# Patient Record
Sex: FEMALE | Race: WHITE | Employment: FULL TIME | URBAN - METROPOLITAN AREA
[De-identification: names, ages, dates, MRNs, and addresses within clinical notes are randomized per-mention and may not be internally consistent; named-entity substitution may affect disease eponyms.]

---

## 2018-05-11 ENCOUNTER — OFFICE VISIT (OUTPATIENT)
Dept: PRIMARY CARE CLINIC | Age: 53
End: 2018-05-11
Payer: COMMERCIAL

## 2018-05-11 VITALS
TEMPERATURE: 98.2 F | WEIGHT: 143 LBS | HEART RATE: 73 BPM | HEIGHT: 64 IN | OXYGEN SATURATION: 97 % | DIASTOLIC BLOOD PRESSURE: 64 MMHG | BODY MASS INDEX: 24.41 KG/M2 | SYSTOLIC BLOOD PRESSURE: 110 MMHG

## 2018-05-11 DIAGNOSIS — I34.1 MITRAL VALVE PROLAPSE: ICD-10-CM

## 2018-05-11 DIAGNOSIS — E78.2 MIXED HYPERLIPIDEMIA: Chronic | ICD-10-CM

## 2018-05-11 DIAGNOSIS — K21.9 GASTROESOPHAGEAL REFLUX DISEASE WITHOUT ESOPHAGITIS: ICD-10-CM

## 2018-05-11 DIAGNOSIS — R00.2 HEART PALPITATIONS: Primary | ICD-10-CM

## 2018-05-11 PROCEDURE — 93000 ELECTROCARDIOGRAM COMPLETE: CPT | Performed by: FAMILY MEDICINE

## 2018-05-11 PROCEDURE — 99214 OFFICE O/P EST MOD 30 MIN: CPT | Performed by: FAMILY MEDICINE

## 2018-05-11 ASSESSMENT — ENCOUNTER SYMPTOMS
ABDOMINAL PAIN: 0
WHEEZING: 0
COUGH: 0
DIARRHEA: 0
CHEST TIGHTNESS: 1
SHORTNESS OF BREATH: 0
BLOOD IN STOOL: 0
VOMITING: 0
CONSTIPATION: 0

## 2018-05-11 ASSESSMENT — PATIENT HEALTH QUESTIONNAIRE - PHQ9
2. FEELING DOWN, DEPRESSED OR HOPELESS: 0
1. LITTLE INTEREST OR PLEASURE IN DOING THINGS: 0
SUM OF ALL RESPONSES TO PHQ9 QUESTIONS 1 & 2: 0
SUM OF ALL RESPONSES TO PHQ QUESTIONS 1-9: 0

## 2018-05-14 ENCOUNTER — HOSPITAL ENCOUNTER (OUTPATIENT)
Age: 53
Discharge: HOME | End: 2018-05-16
Payer: COMMERCIAL

## 2018-05-14 ENCOUNTER — NURSE ONLY (OUTPATIENT)
Dept: PRIMARY CARE CLINIC | Age: 53
End: 2018-05-14

## 2018-05-14 DIAGNOSIS — E78.2 MIXED HYPERLIPIDEMIA: Chronic | ICD-10-CM

## 2018-05-14 DIAGNOSIS — R00.2 HEART PALPITATIONS: ICD-10-CM

## 2018-05-14 LAB
ALBUMIN SERPL-MCNC: 4.4 G/DL (ref 3.5–5.2)
ALP BLD-CCNC: 50 U/L (ref 35–104)
ALT SERPL-CCNC: 12 U/L (ref 0–32)
ANION GAP SERPL CALCULATED.3IONS-SCNC: 15 MMOL/L (ref 7–16)
AST SERPL-CCNC: 16 U/L (ref 0–31)
BILIRUB SERPL-MCNC: 0.5 MG/DL (ref 0–1.2)
BUN BLDV-MCNC: 15 MG/DL (ref 6–20)
CALCIUM SERPL-MCNC: 9.8 MG/DL (ref 8.6–10.2)
CHLORIDE BLD-SCNC: 101 MMOL/L (ref 98–107)
CHOLESTEROL, TOTAL: 195 MG/DL (ref 0–199)
CO2: 24 MMOL/L (ref 22–29)
CREAT SERPL-MCNC: 0.7 MG/DL (ref 0.5–1)
GFR AFRICAN AMERICAN: >60
GFR NON-AFRICAN AMERICAN: >60 ML/MIN/1.73
GLUCOSE BLD-MCNC: 94 MG/DL (ref 74–109)
HCT VFR BLD CALC: 42.2 % (ref 34–48)
HDLC SERPL-MCNC: 59 MG/DL
HEMOGLOBIN: 14.2 G/DL (ref 11.5–15.5)
LDL CHOLESTEROL CALCULATED: 121 MG/DL (ref 0–99)
MCH RBC QN AUTO: 31.7 PG (ref 26–35)
MCHC RBC AUTO-ENTMCNC: 33.6 % (ref 32–34.5)
MCV RBC AUTO: 94.2 FL (ref 80–99.9)
PDW BLD-RTO: 13.2 FL (ref 11.5–15)
PLATELET # BLD: 297 E9/L (ref 130–450)
PMV BLD AUTO: 10.6 FL (ref 7–12)
POTASSIUM SERPL-SCNC: 4.6 MMOL/L (ref 3.5–5)
RBC # BLD: 4.48 E12/L (ref 3.5–5.5)
SODIUM BLD-SCNC: 140 MMOL/L (ref 132–146)
TOTAL PROTEIN: 6.9 G/DL (ref 6.4–8.3)
TRIGL SERPL-MCNC: 75 MG/DL (ref 0–149)
VLDLC SERPL CALC-MCNC: 15 MG/DL
WBC # BLD: 8.2 E9/L (ref 4.5–11.5)

## 2018-05-14 PROCEDURE — 80053 COMPREHEN METABOLIC PANEL: CPT

## 2018-05-14 PROCEDURE — 85027 COMPLETE CBC AUTOMATED: CPT

## 2018-05-14 PROCEDURE — 80061 LIPID PANEL: CPT

## 2019-10-14 ENCOUNTER — OFFICE VISIT (OUTPATIENT)
Dept: PRIMARY CARE CLINIC | Age: 54
End: 2019-10-14
Payer: COMMERCIAL

## 2019-10-14 VITALS
DIASTOLIC BLOOD PRESSURE: 82 MMHG | OXYGEN SATURATION: 98 % | BODY MASS INDEX: 23.17 KG/M2 | TEMPERATURE: 96.7 F | SYSTOLIC BLOOD PRESSURE: 124 MMHG | WEIGHT: 135 LBS | HEART RATE: 57 BPM

## 2019-10-14 DIAGNOSIS — M54.2 CERVICAL PAIN (NECK): Primary | ICD-10-CM

## 2019-10-14 DIAGNOSIS — R09.89 GLOBUS SENSATION: ICD-10-CM

## 2019-10-14 PROCEDURE — 99214 OFFICE O/P EST MOD 30 MIN: CPT | Performed by: FAMILY MEDICINE

## 2019-10-14 RX ORDER — TIZANIDINE HYDROCHLORIDE 4 MG/1
4 CAPSULE, GELATIN COATED ORAL 3 TIMES DAILY
Qty: 30 CAPSULE | Refills: 0 | Status: SHIPPED
Start: 2019-10-14 | End: 2021-04-03

## 2019-10-14 ASSESSMENT — PATIENT HEALTH QUESTIONNAIRE - PHQ9
2. FEELING DOWN, DEPRESSED OR HOPELESS: 0
1. LITTLE INTEREST OR PLEASURE IN DOING THINGS: 0
SUM OF ALL RESPONSES TO PHQ QUESTIONS 1-9: 0
SUM OF ALL RESPONSES TO PHQ QUESTIONS 1-9: 0
SUM OF ALL RESPONSES TO PHQ9 QUESTIONS 1 & 2: 0

## 2019-10-14 ASSESSMENT — ENCOUNTER SYMPTOMS
SORE THROAT: 0
TROUBLE SWALLOWING: 0
BACK PAIN: 1
VOICE CHANGE: 0

## 2019-10-16 ENCOUNTER — HOSPITAL ENCOUNTER (OUTPATIENT)
Dept: GENERAL RADIOLOGY | Age: 54
Discharge: HOME | End: 2019-10-18
Payer: COMMERCIAL

## 2019-10-16 ENCOUNTER — HOSPITAL ENCOUNTER (OUTPATIENT)
Age: 54
Discharge: HOME | End: 2019-10-18
Payer: COMMERCIAL

## 2019-10-16 DIAGNOSIS — M54.2 CERVICAL PAIN (NECK): ICD-10-CM

## 2019-10-16 PROCEDURE — 72040 X-RAY EXAM NECK SPINE 2-3 VW: CPT

## 2019-10-18 ENCOUNTER — TELEPHONE (OUTPATIENT)
Dept: PRIMARY CARE CLINIC | Age: 54
End: 2019-10-18

## 2020-10-06 ENCOUNTER — HOSPITAL ENCOUNTER (OUTPATIENT)
Age: 55
Discharge: HOME | End: 2020-10-08
Payer: COMMERCIAL

## 2020-10-06 PROCEDURE — U0003 INFECTIOUS AGENT DETECTION BY NUCLEIC ACID (DNA OR RNA); SEVERE ACUTE RESPIRATORY SYNDROME CORONAVIRUS 2 (SARS-COV-2) (CORONAVIRUS DISEASE [COVID-19]), AMPLIFIED PROBE TECHNIQUE, MAKING USE OF HIGH THROUGHPUT TECHNOLOGIES AS DESCRIBED BY CMS-2020-01-R: HCPCS

## 2020-10-08 LAB
SARS-COV-2: NOT DETECTED
SOURCE: NORMAL

## 2020-11-25 LAB
SARS-COV-2: NOT DETECTED
SOURCE: NORMAL

## 2020-12-02 LAB
SARS-COV-2: NOT DETECTED
SOURCE: NORMAL

## 2021-02-11 LAB
SARS-COV-2: NOT DETECTED
SOURCE: NORMAL

## 2021-03-06 LAB
SARS-COV-2: NOT DETECTED
SOURCE: NORMAL

## 2021-03-13 LAB
SARS-COV-2: NOT DETECTED
SOURCE: NORMAL

## 2021-03-20 LAB
SARS-COV-2: NOT DETECTED
SOURCE: NORMAL

## 2021-03-27 LAB
SARS-COV-2: NOT DETECTED
SOURCE: NORMAL

## 2021-04-03 ENCOUNTER — APPOINTMENT (OUTPATIENT)
Dept: GENERAL RADIOLOGY | Age: 56
End: 2021-04-03
Payer: COMMERCIAL

## 2021-04-03 ENCOUNTER — HOSPITAL ENCOUNTER (EMERGENCY)
Age: 56
Discharge: HOME | End: 2021-04-03
Attending: EMERGENCY MEDICINE
Payer: COMMERCIAL

## 2021-04-03 VITALS
WEIGHT: 139 LBS | TEMPERATURE: 98.4 F | DIASTOLIC BLOOD PRESSURE: 82 MMHG | SYSTOLIC BLOOD PRESSURE: 130 MMHG | HEART RATE: 70 BPM | OXYGEN SATURATION: 100 % | RESPIRATION RATE: 16 BRPM | HEIGHT: 63 IN | BODY MASS INDEX: 24.63 KG/M2

## 2021-04-03 DIAGNOSIS — S82.832A CLOSED FRACTURE OF DISTAL END OF LEFT FIBULA, UNSPECIFIED FRACTURE MORPHOLOGY, INITIAL ENCOUNTER: Primary | ICD-10-CM

## 2021-04-03 PROCEDURE — 99282 EMERGENCY DEPT VISIT SF MDM: CPT

## 2021-04-03 PROCEDURE — 73610 X-RAY EXAM OF ANKLE: CPT

## 2021-04-03 PROCEDURE — 29515 APPLICATION SHORT LEG SPLINT: CPT

## 2021-04-03 RX ORDER — HYDROCODONE BITARTRATE AND ACETAMINOPHEN 5; 325 MG/1; MG/1
1 TABLET ORAL EVERY 6 HOURS PRN
Qty: 12 TABLET | Refills: 0 | Status: SHIPPED | OUTPATIENT
Start: 2021-04-03 | End: 2021-04-06

## 2021-04-03 ASSESSMENT — PAIN DESCRIPTION - LOCATION: LOCATION: ANKLE

## 2021-04-03 ASSESSMENT — PAIN SCALES - GENERAL: PAINLEVEL_OUTOF10: 2

## 2021-04-04 NOTE — ED PROVIDER NOTES
HPI:  4/3/21,   Time: 9:00 PM EDT       Jorge Gonsalves is a 63 y.o. female presenting to the ED for fall/jose de jesus ankle pain, beginning 30 min ago. The complaint has been persistent, mild in severity, and worsened by bearing weight. Left worse than right, fall mech, stepped off stairs wrong, no other pain. No loc, no abd/chest trauma    Review of Systems:   Pertinent positives and negatives are stated within HPI, all other systems reviewed and are negative.          --------------------------------------------- PAST HISTORY ---------------------------------------------  Past Medical History:  has a past medical history of Anemia, iron deficiency, GERD (gastroesophageal reflux disease), Heart palpitations, Intermittent lightheadedness, Mitral valve prolapse, Osteoarthritis, and Sinus bradycardia. Past Surgical History:  has a past surgical history that includes Tubal ligation (2008); Colonoscopy (03/14/2015); and Hysterectomy, vaginal (2016). Social History:  reports that she has been smoking cigarettes. She has a 25.00 pack-year smoking history. She has never used smokeless tobacco. She reports current alcohol use. Family History: family history includes Arthritis in her mother; Cancer in her father; No Known Problems in her child, child, child and another family member; Other in her brother, father, and mother. The patients home medications have been reviewed. Allergies: Latex and Metamucil [psyllium]        ---------------------------------------------------PHYSICAL EXAM--------------------------------------    Constitutional/General: Alert and oriented x3, well appearing, non toxic in NAD  Head: Normocephalic and atraumatic  Eyes: PERRL, EOMI, conjunctive normal, sclera non icteric  Mouth: Oropharynx clear, handling secretions,  Neck: Supple, full ROM,   Musculoskeletal: Moves all extremities x 4. Warm and well perfused, no clubbing, cyanosis, or edema.  Capillary refill <3 seconds, right ankle go gómez after, dc on crutches, outpt fu      This patient's ED course included: a personal history and physicial examination    This patient has remained hemodynamically stable during their ED course. Counseling: The emergency provider has spoken with the patient and discussed todays results, in addition to providing specific details for the plan of care and counseling regarding the diagnosis and prognosis. Questions are answered at this time and they are agreeable with the plan.       --------------------------------- IMPRESSION AND DISPOSITION ---------------------------------    IMPRESSION  1. Closed fracture of distal end of left fibula, unspecified fracture morphology, initial encounter        DISPOSITION  Disposition: Discharge to home  Patient condition is stable    NOTE: This report was transcribed using voice recognition software.  Every effort was made to ensure accuracy; however, inadvertent computerized transcription errors may be present        Sandra Saucedo MD  04/03/21 [id number]

## 2021-11-12 ENCOUNTER — OFFICE VISIT (OUTPATIENT)
Dept: PRIMARY CARE CLINIC | Age: 56
End: 2021-11-12
Payer: COMMERCIAL

## 2021-11-12 VITALS
SYSTOLIC BLOOD PRESSURE: 128 MMHG | HEART RATE: 80 BPM | DIASTOLIC BLOOD PRESSURE: 80 MMHG | BODY MASS INDEX: 24.98 KG/M2 | TEMPERATURE: 98 F | OXYGEN SATURATION: 96 % | WEIGHT: 141 LBS

## 2021-11-12 DIAGNOSIS — U07.1 COVID-19 VIRUS INFECTION: ICD-10-CM

## 2021-11-12 DIAGNOSIS — Z00.00 ANNUAL PHYSICAL EXAM: Primary | ICD-10-CM

## 2021-11-12 PROCEDURE — 99396 PREV VISIT EST AGE 40-64: CPT | Performed by: FAMILY MEDICINE

## 2021-11-12 PROCEDURE — 81002 URINALYSIS NONAUTO W/O SCOPE: CPT | Performed by: FAMILY MEDICINE

## 2021-11-12 ASSESSMENT — PATIENT HEALTH QUESTIONNAIRE - PHQ9
1. LITTLE INTEREST OR PLEASURE IN DOING THINGS: 0
SUM OF ALL RESPONSES TO PHQ9 QUESTIONS 1 & 2: 0
SUM OF ALL RESPONSES TO PHQ QUESTIONS 1-9: 0
2. FEELING DOWN, DEPRESSED OR HOPELESS: 0
SUM OF ALL RESPONSES TO PHQ QUESTIONS 1-9: 0
SUM OF ALL RESPONSES TO PHQ QUESTIONS 1-9: 0

## 2021-11-12 NOTE — PROGRESS NOTES
SUBJECTIVE:    HPI: Travon Giraldo  Was seen here today for   Chief Complaint   Patient presents with    Annual Exam    .  She states she is dealing with no new issues. Past Medical History:   Diagnosis Date    Anemia, iron deficiency 02/13/2015    chronic    COVID-19 virus infection 09/04/2021    GERD (gastroesophageal reflux disease)     gerd    Heart palpitations 07/10/2007    and feelings og tachycardia    Intermittent lightheadedness 07/10/2007    mild with dizziness and no syncope    Mitral valve prolapse 1980    Osteoarthritis 03/28/2013    Carpometacarpal arthritis Rt thumb> Ly for years    Sinus bradycardia        Past Surgical History:   Procedure Laterality Date    ANKLE SURGERY Left 04/13/2021    orif Dr. Aiden Vick COLONOSCOPY  03/14/2015    HYSTERECTOMY, VAGINAL  2016    Dr. Wojciech Saldaña  2008    and ablation       Family History   Problem Relation Age of Onset    Arthritis Mother     Other Mother         MI at age 48   Hazel Cancer Father         lung    Other Father         MI/CABG    No Known Problems Other     No Known Problems Child     No Known Problems Child     No Known Problems Child     Other Brother         marfan syndrome       Prior to Admission medications    Medication Sig Start Date End Date Taking?  Authorizing Provider   omeprazole (PRILOSEC) 20 MG capsule Take 20 mg by mouth daily    Historical Provider, MD       Latex and Metamucil [psyllium]    Social History     Tobacco Use    Smoking status: Current Every Day Smoker     Packs/day: 1.00     Years: 25.00     Pack years: 25.00     Types: Cigarettes    Smokeless tobacco: Never Used   Substance Use Topics    Alcohol use: Yes     Comment: social         Review Of Systems:    Skin: no abnormal pigmentation, rash, scaling, itching, masses, hair or nail changes  Eyes: no blurring, diplopia, or eye pain  Ears/Nose/Throat: no hearing loss, tinnitus, vertigo, nosebleed, nasal congestion, rhinorrhea, sore throat  Respiratory: no cough, pleuritic chest pain, dyspnea, or wheezing. Still smoking. Cardiovascular: no chest pain, angina, dyspnea on exertion, orthopnea, PND, palpitations, or claudication  Gastrointestinal: no nausea, vomiting, heartburn, diarrhea, constipation, bloating,  abdominal pain, or blood per rectum  Genitourinary: no urinary urgency, frequency, dysuria, nocturia, hesitancy, or incontinence  Musculoskeletal: no arthritis, arthralgia, myalgia, weakness, or morning stiffness  Neurologic: no paralysis, paresis, paresthesia, seizures, tremors, or headaches  Hematologic/Lymphatic/Immunologic: no anemia, abnormal bleeding/bruising, fever, chills, night sweats, swollen glands, or unexplained weight loss  Endocrine: no heat or cold intolerance and no polyphagia, polydipsia, or polyuria      OBJECTIVE:    VS: /80   Pulse 80   Temp 98 °F (36.7 °C)   Wt 141 lb (64 kg)   SpO2 96%   BMI 24.98 kg/m²   General appearance: Alert, Awake, Oriented times 3, no distress  Skin: Warm and dry  Head: Normocephalic. No masses, lesions or tenderness noted  Eyes: Conjunctivae appear normal. PERRL  Ears: External ears normal, TM's clear and intact  Nose/Sinuses: Nares normal. Septum midline. Mucosa normal. No drainage  Oropharynx: Oropharynx clear with no exudate seen  Neck: Neck supple. No jugular venous distension, lymphadenopathy or thyromegaly Trachea midline. No carotid bruits  Lungs: Lungs clear to auscultation bilaterally, but decreased. No rhonchi, crackles or wheezes  Heart: S1 S2  Regular rate and rhythm. No rub, murmur or gallop  Abdomen: Abdomen soft, non-tender. BS normal. No masses, organomegaly  Extremities: No edema, Peripheral pulses palpable  Musculoskeletal: Muscular strength appears intact. No joint effusion, tenderness, swelling or warmth  Neuro:  No focal motor or sensory deficits.  2+/4 L4 reflexes        ASSESSMENT/PLAN:  Gia Mendoza was seen today for annual exam.    Diagnoses and all orders for this visit:    Annual physical exam  -     Lipid Panel; Future  -     CBC; Future  -     Comprehensive Metabolic Panel; Future  -     POCT Urinalysis no Micro    COVID-19 virus infection  -     Covid-19, Antibody; Future     - quit smoking    Return for based on lab results.     Manuel Strauss, DO

## 2021-11-16 DIAGNOSIS — U07.1 COVID-19 VIRUS INFECTION: ICD-10-CM

## 2021-11-16 DIAGNOSIS — Z00.00 ANNUAL PHYSICAL EXAM: ICD-10-CM

## 2021-11-16 LAB
ALBUMIN SERPL-MCNC: 4.4 G/DL (ref 3.5–5.2)
ALP BLD-CCNC: 59 U/L (ref 35–104)
ALT SERPL-CCNC: 17 U/L (ref 0–32)
ANION GAP SERPL CALCULATED.3IONS-SCNC: 12 MMOL/L (ref 7–16)
AST SERPL-CCNC: 17 U/L (ref 0–31)
BILIRUB SERPL-MCNC: <0.2 MG/DL (ref 0–1.2)
BUN BLDV-MCNC: 17 MG/DL (ref 6–20)
CALCIUM SERPL-MCNC: 9.3 MG/DL (ref 8.6–10.2)
CHLORIDE BLD-SCNC: 104 MMOL/L (ref 98–107)
CHOLESTEROL, TOTAL: 228 MG/DL (ref 0–199)
CO2: 22 MMOL/L (ref 22–29)
CREAT SERPL-MCNC: 0.8 MG/DL (ref 0.5–1)
GFR AFRICAN AMERICAN: >60
GFR NON-AFRICAN AMERICAN: >60 ML/MIN/1.73
GLUCOSE BLD-MCNC: 100 MG/DL (ref 74–99)
HCT VFR BLD CALC: 43.7 % (ref 34–48)
HDLC SERPL-MCNC: 55 MG/DL
HEMOGLOBIN: 14.6 G/DL (ref 11.5–15.5)
LDL CHOLESTEROL CALCULATED: 146 MG/DL (ref 0–99)
MCH RBC QN AUTO: 31.7 PG (ref 26–35)
MCHC RBC AUTO-ENTMCNC: 33.4 % (ref 32–34.5)
MCV RBC AUTO: 94.8 FL (ref 80–99.9)
PDW BLD-RTO: 13 FL (ref 11.5–15)
PLATELET # BLD: 282 E9/L (ref 130–450)
PMV BLD AUTO: 10 FL (ref 7–12)
POTASSIUM SERPL-SCNC: 3.9 MMOL/L (ref 3.5–5)
RBC # BLD: 4.61 E12/L (ref 3.5–5.5)
SARS-COV-2 ANTIBODY, TOTAL: REACTIVE
SODIUM BLD-SCNC: 138 MMOL/L (ref 132–146)
TOTAL PROTEIN: 6.9 G/DL (ref 6.4–8.3)
TRIGL SERPL-MCNC: 135 MG/DL (ref 0–149)
VLDLC SERPL CALC-MCNC: 27 MG/DL
WBC # BLD: 6.7 E9/L (ref 4.5–11.5)

## 2021-11-22 ENCOUNTER — TELEPHONE (OUTPATIENT)
Dept: PRIMARY CARE CLINIC | Age: 56
End: 2021-11-22

## 2021-11-22 NOTE — TELEPHONE ENCOUNTER
Left message cholesterol elevated. 6.3% risk of ASCVD in the next 10 years. Recommend low-cholesterol diet and statin. Recommend stopping smoking as well. Remainder labs stable. She does have antibodies to the Covid virus. I still recommend the Covid vaccine.

## 2022-06-10 ENCOUNTER — APPOINTMENT (OUTPATIENT)
Dept: GENERAL RADIOLOGY | Age: 57
End: 2022-06-10
Payer: COMMERCIAL

## 2022-06-10 ENCOUNTER — HOSPITAL ENCOUNTER (EMERGENCY)
Age: 57
Discharge: HOME | End: 2022-06-10
Attending: STUDENT IN AN ORGANIZED HEALTH CARE EDUCATION/TRAINING PROGRAM
Payer: COMMERCIAL

## 2022-06-10 VITALS
RESPIRATION RATE: 17 BRPM | TEMPERATURE: 97.1 F | HEART RATE: 70 BPM | HEIGHT: 63 IN | BODY MASS INDEX: 24.8 KG/M2 | SYSTOLIC BLOOD PRESSURE: 146 MMHG | WEIGHT: 140 LBS | DIASTOLIC BLOOD PRESSURE: 84 MMHG | OXYGEN SATURATION: 99 %

## 2022-06-10 DIAGNOSIS — S39.012A BACK STRAIN, INITIAL ENCOUNTER: Primary | ICD-10-CM

## 2022-06-10 LAB
ANION GAP SERPL CALCULATED.3IONS-SCNC: 13 MMOL/L (ref 7–16)
BASOPHILS ABSOLUTE: 0.04 E9/L (ref 0–0.2)
BASOPHILS RELATIVE PERCENT: 0.5 % (ref 0–2)
BUN BLDV-MCNC: 20 MG/DL (ref 6–20)
CALCIUM SERPL-MCNC: 10 MG/DL (ref 8.6–10.2)
CHLORIDE BLD-SCNC: 98 MMOL/L (ref 98–107)
CO2: 25 MMOL/L (ref 22–29)
CREAT SERPL-MCNC: 0.6 MG/DL (ref 0.5–1)
EOSINOPHILS ABSOLUTE: 0.18 E9/L (ref 0.05–0.5)
EOSINOPHILS RELATIVE PERCENT: 2.2 % (ref 0–6)
GFR AFRICAN AMERICAN: >60
GFR NON-AFRICAN AMERICAN: >60 ML/MIN/1.73
GLUCOSE BLD-MCNC: 98 MG/DL (ref 74–99)
HCT VFR BLD CALC: 42 % (ref 34–48)
HEMOGLOBIN: 14.6 G/DL (ref 11.5–15.5)
IMMATURE GRANULOCYTES #: 0.03 E9/L
IMMATURE GRANULOCYTES %: 0.4 % (ref 0–5)
LYMPHOCYTES ABSOLUTE: 2.65 E9/L (ref 1.5–4)
LYMPHOCYTES RELATIVE PERCENT: 31.7 % (ref 20–42)
MAGNESIUM: 1.9 MG/DL (ref 1.6–2.6)
MCH RBC QN AUTO: 31.9 PG (ref 26–35)
MCHC RBC AUTO-ENTMCNC: 34.8 % (ref 32–34.5)
MCV RBC AUTO: 91.7 FL (ref 80–99.9)
MONOCYTES ABSOLUTE: 0.67 E9/L (ref 0.1–0.95)
MONOCYTES RELATIVE PERCENT: 8 % (ref 2–12)
NEUTROPHILS ABSOLUTE: 4.79 E9/L (ref 1.8–7.3)
NEUTROPHILS RELATIVE PERCENT: 57.2 % (ref 43–80)
PDW BLD-RTO: 12.9 FL (ref 11.5–15)
PLATELET # BLD: 277 E9/L (ref 130–450)
PMV BLD AUTO: 9.1 FL (ref 7–12)
POTASSIUM REFLEX MAGNESIUM: 3.5 MMOL/L (ref 3.5–5)
PRO-BNP: 60 PG/ML (ref 0–125)
RBC # BLD: 4.58 E12/L (ref 3.5–5.5)
SODIUM BLD-SCNC: 136 MMOL/L (ref 132–146)
TROPONIN, HIGH SENSITIVITY: <6 NG/L (ref 0–9)
WBC # BLD: 8.4 E9/L (ref 4.5–11.5)

## 2022-06-10 PROCEDURE — 93005 ELECTROCARDIOGRAM TRACING: CPT | Performed by: STUDENT IN AN ORGANIZED HEALTH CARE EDUCATION/TRAINING PROGRAM

## 2022-06-10 PROCEDURE — 83880 ASSAY OF NATRIURETIC PEPTIDE: CPT

## 2022-06-10 PROCEDURE — 83735 ASSAY OF MAGNESIUM: CPT

## 2022-06-10 PROCEDURE — 85025 COMPLETE CBC W/AUTO DIFF WBC: CPT

## 2022-06-10 PROCEDURE — 71045 X-RAY EXAM CHEST 1 VIEW: CPT

## 2022-06-10 PROCEDURE — 80048 BASIC METABOLIC PNL TOTAL CA: CPT

## 2022-06-10 PROCEDURE — 99285 EMERGENCY DEPT VISIT HI MDM: CPT

## 2022-06-10 PROCEDURE — 36415 COLL VENOUS BLD VENIPUNCTURE: CPT

## 2022-06-10 PROCEDURE — 84484 ASSAY OF TROPONIN QUANT: CPT

## 2022-06-10 ASSESSMENT — PAIN DESCRIPTION - FREQUENCY: FREQUENCY: INTERMITTENT

## 2022-06-10 ASSESSMENT — PAIN DESCRIPTION - ONSET: ONSET: SUDDEN

## 2022-06-10 ASSESSMENT — PAIN DESCRIPTION - PAIN TYPE: TYPE: ACUTE PAIN

## 2022-06-10 ASSESSMENT — PAIN - FUNCTIONAL ASSESSMENT: PAIN_FUNCTIONAL_ASSESSMENT: 0-10

## 2022-06-10 ASSESSMENT — PAIN DESCRIPTION - DESCRIPTORS: DESCRIPTORS: TIGHTNESS

## 2022-06-10 ASSESSMENT — PAIN SCALES - GENERAL: PAINLEVEL_OUTOF10: 2

## 2022-06-10 ASSESSMENT — PAIN DESCRIPTION - ORIENTATION: ORIENTATION: RIGHT

## 2022-06-10 ASSESSMENT — PAIN DESCRIPTION - LOCATION: LOCATION: BACK;SHOULDER

## 2022-06-10 NOTE — ED PROVIDER NOTES
ED  Provider Note  Admit Date/RoomTime: 6/10/2022 12:04 PM  ED Room: 02/02      History of Present Illness:  6/10/22, Time: 12:10 PM EDT  Chief Complaint   Patient presents with    Back Pain     woke up sore, then pain started in right shoulder blade. Checked BP and it was elevated which is not normal.         Genaro Rock is a 61 y.o. female presenting to the ED for L scapular pain onset 11 am intermittent lasting seconds to minutes, mild, ache. No CP, no arm jaw back pain  Not worse with exertion   Worse with certain movements   Lifts 3-4 days per week so was concerned for MSK pain   Palpitations chronically but BP was elevated   No nausea or diaphoresis   No ccr  No sob   Patient has no personal history of DVT/PE, no unilateral leg swelling or edema/erythema, no recent surgeries or immobilizations, no active cancer. Review of Systems:   A complete review of systems was performed and pertinent positives and negatives are stated within HPI, all other systems reviewed and are negative.        --------------------------------------------- PATIENT HISTORY--------------------------------------------  Past Medical History:  has a past medical history of Anemia, iron deficiency, COVID-19 virus infection, GERD (gastroesophageal reflux disease), Heart palpitations, Intermittent lightheadedness, Mitral valve prolapse, Osteoarthritis, and Sinus bradycardia. Past Surgical History:  has a past surgical history that includes Tubal ligation (2008); Colonoscopy (03/14/2015); Hysterectomy, vaginal (2016); and Ankle surgery (Left, 04/13/2021). Family History: family history includes Arthritis in her mother; Cancer in her father; No Known Problems in her child, child, child and another family member; Other in her brother, father, and mother. . Unless otherwise noted, family history is non contributory    Social History:  reports that she has been smoking cigarettes. She has a 25.00 pack-year smoking history.  She has Immature Granulocytes # 0.03 E9/L    Lymphocytes Absolute 2.65 1.50 - 4.00 E9/L    Monocytes Absolute 0.67 0.10 - 0.95 E9/L    Eosinophils Absolute 0.18 0.05 - 0.50 E9/L    Basophils Absolute 0.04 0.00 - 0.20 I2/S   Basic Metabolic Panel w/ Reflex to MG   Result Value Ref Range    Sodium 136 132 - 146 mmol/L    Potassium reflex Magnesium 3.5 3.5 - 5.0 mmol/L    Chloride 98 98 - 107 mmol/L    CO2 25 22 - 29 mmol/L    Anion Gap 13 7 - 16 mmol/L    Glucose 98 74 - 99 mg/dL    BUN 20 6 - 20 mg/dL    CREATININE 0.6 0.5 - 1.0 mg/dL    GFR Non-African American >60 >=60 mL/min/1.73    GFR African American >60     Calcium 10.0 8.6 - 10.2 mg/dL   Troponin   Result Value Ref Range    Troponin, High Sensitivity <6 0 - 9 ng/L   Brain Natriuretic Peptide   Result Value Ref Range    Pro-BNP 60 0 - 125 pg/mL   Magnesium   Result Value Ref Range    Magnesium 1.9 1.6 - 2.6 mg/dL   EKG 12 Lead   Result Value Ref Range    Ventricular Rate 62 BPM    Atrial Rate 62 BPM    P-R Interval 150 ms    QRS Duration 96 ms    Q-T Interval 436 ms    QTc Calculation (Bazett) 442 ms    P Axis 71 degrees    R Axis 79 degrees    T Axis 57 degrees   ,       RADIOLOGY:  Imaging interpreted by Radiologist unless otherwise specified  XR CHEST PORTABLE   Final Result   No acute process. ------------------------- NURSING NOTES AND VITALS REVIEWED ---------------------------  The nursing notes within the ED encounter and vital signs as below have been reviewed by myself  BP (!) 146/84   Pulse 70   Temp 97.1 °F (36.2 °C) (Temporal)   Resp 17   Ht 5' 3\" (1.6 m)   Wt 140 lb (63.5 kg)   SpO2 99%   BMI 24.80 kg/m²      The patients available past medical records and past encounters were reviewed. ------------------------------ ED COURSE/MEDICAL DECISION MAKING----------------------  Medications - No data to display    I, Dr. Jemal Hall, am the primary provider of record    Medical Decision Making:   Anginal equivalent. Vitally stable. Unremarkable workup. Vitals table. ED Counseling: This emergency provider has spoken with the patient and any family present to discuss clinical status, results, plan of care, diagnosis and prognosis as able to be determined at this time. Any questions were answered and patient and/or family/POA are agreeable with the plan.       --------------------------------- IMPRESSION AND DISPOSITION ---------------------------------    IMPRESSION  1. Back strain, initial encounter        DISPOSITION  Disposition: Discharge to home  Patient condition is good      This report was transcribed using voice recognition software. Every effort was made to ensure accuracy; however, transcription errors may be present.          Beulah Bahena MD  06/12/22 [id number]

## 2022-06-12 LAB
EKG ATRIAL RATE: 62 BPM
EKG P AXIS: 71 DEGREES
EKG P-R INTERVAL: 150 MS
EKG Q-T INTERVAL: 436 MS
EKG QRS DURATION: 96 MS
EKG QTC CALCULATION (BAZETT): 442 MS
EKG R AXIS: 79 DEGREES
EKG T AXIS: 57 DEGREES
EKG VENTRICULAR RATE: 62 BPM

## 2022-08-18 LAB — MAMMOGRAPHY, EXTERNAL: NORMAL

## 2023-02-13 ENCOUNTER — OFFICE VISIT (OUTPATIENT)
Dept: PRIMARY CARE CLINIC | Age: 58
End: 2023-02-13
Payer: COMMERCIAL

## 2023-02-13 VITALS
OXYGEN SATURATION: 97 % | HEIGHT: 64 IN | DIASTOLIC BLOOD PRESSURE: 82 MMHG | TEMPERATURE: 98 F | WEIGHT: 143 LBS | BODY MASS INDEX: 24.41 KG/M2 | HEART RATE: 68 BPM | SYSTOLIC BLOOD PRESSURE: 136 MMHG

## 2023-02-13 DIAGNOSIS — R11.0 NAUSEA: ICD-10-CM

## 2023-02-13 DIAGNOSIS — R10.33 ACUTE PERIUMBILICAL PAIN: Primary | ICD-10-CM

## 2023-02-13 PROCEDURE — 99213 OFFICE O/P EST LOW 20 MIN: CPT | Performed by: FAMILY MEDICINE

## 2023-02-13 RX ORDER — PROMETHAZINE HYDROCHLORIDE 25 MG/1
25 TABLET ORAL 3 TIMES DAILY PRN
Qty: 12 TABLET | Refills: 0 | Status: SHIPPED | OUTPATIENT
Start: 2023-02-13 | End: 2023-02-20

## 2023-02-13 SDOH — ECONOMIC STABILITY: HOUSING INSECURITY
IN THE LAST 12 MONTHS, WAS THERE A TIME WHEN YOU DID NOT HAVE A STEADY PLACE TO SLEEP OR SLEPT IN A SHELTER (INCLUDING NOW)?: NO

## 2023-02-13 SDOH — ECONOMIC STABILITY: TRANSPORTATION INSECURITY
IN THE PAST 12 MONTHS, HAS LACK OF TRANSPORTATION KEPT YOU FROM MEETINGS, WORK, OR FROM GETTING THINGS NEEDED FOR DAILY LIVING?: NO

## 2023-02-13 SDOH — ECONOMIC STABILITY: INCOME INSECURITY: HOW HARD IS IT FOR YOU TO PAY FOR THE VERY BASICS LIKE FOOD, HOUSING, MEDICAL CARE, AND HEATING?: NOT HARD AT ALL

## 2023-02-13 SDOH — ECONOMIC STABILITY: FOOD INSECURITY: WITHIN THE PAST 12 MONTHS, THE FOOD YOU BOUGHT JUST DIDN'T LAST AND YOU DIDN'T HAVE MONEY TO GET MORE.: NEVER TRUE

## 2023-02-13 SDOH — ECONOMIC STABILITY: FOOD INSECURITY: WITHIN THE PAST 12 MONTHS, YOU WORRIED THAT YOUR FOOD WOULD RUN OUT BEFORE YOU GOT MONEY TO BUY MORE.: NEVER TRUE

## 2023-02-13 ASSESSMENT — ENCOUNTER SYMPTOMS
DIARRHEA: 0
BELCHING: 0
ABDOMINAL DISTENTION: 1
NAUSEA: 1
CONSTIPATION: 0
VOMITING: 0
HEMATOCHEZIA: 0
BLOOD IN STOOL: 0
ABDOMINAL PAIN: 1

## 2023-02-13 ASSESSMENT — PATIENT HEALTH QUESTIONNAIRE - PHQ9
SUM OF ALL RESPONSES TO PHQ QUESTIONS 1-9: 0
1. LITTLE INTEREST OR PLEASURE IN DOING THINGS: 0
SUM OF ALL RESPONSES TO PHQ9 QUESTIONS 1 & 2: 0
2. FEELING DOWN, DEPRESSED OR HOPELESS: 0
SUM OF ALL RESPONSES TO PHQ QUESTIONS 1-9: 0

## 2023-02-13 NOTE — PROGRESS NOTES
Doyle Torres, a female of 61 y.o. came to the office 2/13/2023. Patient Active Problem List   Diagnosis    Anemia, iron deficiency    GERD (gastroesophageal reflux disease)    Mitral valve prolapse    Osteoarthritis of both hands    Sinus bradycardia    Mixed hyperlipidemia          Abdominal Pain  This is a new problem. The current episode started in the past 7 days (1 wk). The pain is located in the periumbilical region. The quality of the pain is aching and dull. The abdominal pain radiates to the back. Associated symptoms include nausea (after dinner last pm.). Pertinent negatives include no anorexia, belching, constipation, diarrhea, fever, hematochezia, melena, vomiting or weight loss. Associated symptoms comments: Bloated  . The pain is aggravated by eating. The pain is relieved by Nothing. She has tried proton pump inhibitors (nsaid) for the symptoms. The treatment provided no relief. Allergies   Allergen Reactions    Latex     Metamucil [Psyllium]        Current Outpatient Medications on File Prior to Visit   Medication Sig Dispense Refill    omeprazole (PRILOSEC) 20 MG capsule Take 20 mg by mouth daily       No current facility-administered medications on file prior to visit. Review of Systems   Constitutional:  Negative for fever and weight loss. Gastrointestinal:  Positive for abdominal distention, abdominal pain and nausea (after dinner last pm.). Negative for anorexia, blood in stool, constipation, diarrhea, hematochezia, melena and vomiting. other review of systems reviewed and are negative    OBJECTIVE:  /82   Pulse 68   Temp 98 °F (36.7 °C)   Ht 5' 4\" (1.626 m)   Wt 143 lb (64.9 kg)   SpO2 97%   BMI 24.55 kg/m²      Physical Exam  Vitals reviewed. Eyes:      General: No scleral icterus. Conjunctiva/sclera: Conjunctivae normal.   Neck:      Thyroid: No thyromegaly. Cardiovascular:      Rate and Rhythm: Normal rate and regular rhythm. Heart sounds:  No murmur heard. Pulmonary:      Effort: Pulmonary effort is normal.      Breath sounds: Normal breath sounds. No wheezing or rales. Abdominal:      General: Bowel sounds are normal. There is no distension. Palpations: Abdomen is soft. There is no mass. Tenderness: There is abdominal tenderness in the periumbilical area. There is no guarding or rebound. Negative signs include Ghosh's sign. Musculoskeletal:         General: Normal range of motion. Cervical back: Neck supple. Lymphadenopathy:      Cervical: No cervical adenopathy. Skin:     General: Skin is warm and dry. Neurological:      Mental Status: She is alert and oriented to person, place, and time. Psychiatric:         Mood and Affect: Mood normal.       ASSESSMENT AND PLAN:    Rosanne Wilcox was seen today for abdominal pain. Diagnoses and all orders for this visit:    Acute periumbilical pain  -     Lipase; Future  -     CBC with Auto Differential; Future  -     Comprehensive Metabolic Panel; Future    Nausea  -     promethazine (PHENERGAN) 25 MG tablet; Take 1 tablet by mouth 3 times daily as needed for Nausea  - quit smoking. Return if symptoms worsen or fail to improve, for based on lab results.     Arlene Strauss, DO

## 2023-02-15 ENCOUNTER — TELEPHONE (OUTPATIENT)
Dept: PRIMARY CARE CLINIC | Age: 58
End: 2023-02-15

## 2023-02-15 NOTE — TELEPHONE ENCOUNTER
Spoke with patient her labs are normal.  She states her nausea is improving as well as her abdominal pain. I wonder if she had a little viral bug here. Since she is improving we will continue to monitor. Call if worsens.   Discussed following up for a annual physical exam.

## 2023-05-18 ENCOUNTER — OFFICE VISIT (OUTPATIENT)
Dept: PRIMARY CARE CLINIC | Age: 58
End: 2023-05-18
Payer: COMMERCIAL

## 2023-05-18 VITALS
BODY MASS INDEX: 25.1 KG/M2 | DIASTOLIC BLOOD PRESSURE: 77 MMHG | HEIGHT: 64 IN | RESPIRATION RATE: 19 BRPM | WEIGHT: 147 LBS | HEART RATE: 64 BPM | TEMPERATURE: 97.4 F | OXYGEN SATURATION: 98 % | SYSTOLIC BLOOD PRESSURE: 133 MMHG

## 2023-05-18 DIAGNOSIS — B96.89 ACUTE BACTERIAL SINUSITIS: Primary | ICD-10-CM

## 2023-05-18 DIAGNOSIS — J01.90 ACUTE BACTERIAL SINUSITIS: Primary | ICD-10-CM

## 2023-05-18 PROCEDURE — 99213 OFFICE O/P EST LOW 20 MIN: CPT | Performed by: NURSE PRACTITIONER

## 2023-05-18 RX ORDER — CEFDINIR 300 MG/1
300 CAPSULE ORAL 2 TIMES DAILY
Qty: 20 CAPSULE | Refills: 0 | Status: SHIPPED | OUTPATIENT
Start: 2023-05-18 | End: 2023-05-28

## 2023-05-18 NOTE — PROGRESS NOTES
Chief Complaint:   Cough and Sinusitis      History of Present Illness   Source of history provided by:  patient. Kamila Robledo is a 61 y.o. old female with a past medical history of:   Past Medical History:   Diagnosis Date    Anemia, iron deficiency 02/13/2015    chronic    COVID-19 virus infection 09/04/2021    GERD (gastroesophageal reflux disease)     gerd    Heart palpitations 07/10/2007    and feelings og tachycardia    Intermittent lightheadedness 07/10/2007    mild with dizziness and no syncope    Mitral valve prolapse 1980    Osteoarthritis 03/28/2013    Carpometacarpal arthritis Rt thumb> Ly for years    Sinus bradycardia         Pt presents to the [facility] care with a cough/nasal congestion/sinus pressure for the past several days. States the cough is  productive with clear sputum. No fever noted. Denies any N/V/D, abdominal pain, CP, progressive SOB, dizziness, or lethargy. ROS    Unless otherwise stated in this report or unable to obtain because of the patient's clinical or mental status as evidenced by the medical record, this patients's positive and negative responses for Review of Systems, constitutional, psych, eyes, ENT, cardiovascular, respiratory, gastrointestinal, neurological, genitourinary, musculoskeletal, integument systems and systems related to the presenting problem are either stated in the preceding or were not pertinent or were negative for the symptoms and/or complaints related to the medical problem. Past Surgical History:  has a past surgical history that includes Tubal ligation (2008); Colonoscopy (03/14/2015); Hysterectomy, vaginal (2016); and Ankle surgery (Left, 04/13/2021). Social History:  reports that she has been smoking cigarettes. She has a 25.00 pack-year smoking history. She has never used smokeless tobacco. She reports current alcohol use.   Family History: family history includes Arthritis in her mother; Cancer in her father; No Known Problems in

## 2023-10-03 ENCOUNTER — OFFICE VISIT (OUTPATIENT)
Dept: PRIMARY CARE CLINIC | Age: 58
End: 2023-10-03
Payer: COMMERCIAL

## 2023-10-03 VITALS
TEMPERATURE: 96.8 F | WEIGHT: 149 LBS | DIASTOLIC BLOOD PRESSURE: 82 MMHG | SYSTOLIC BLOOD PRESSURE: 122 MMHG | HEART RATE: 51 BPM | OXYGEN SATURATION: 98 % | BODY MASS INDEX: 25.58 KG/M2

## 2023-10-03 DIAGNOSIS — H61.21 IMPACTED CERUMEN OF RIGHT EAR: ICD-10-CM

## 2023-10-03 DIAGNOSIS — R42 DIZZINESS: Primary | ICD-10-CM

## 2023-10-03 DIAGNOSIS — F51.01 PRIMARY INSOMNIA: ICD-10-CM

## 2023-10-03 PROCEDURE — 99213 OFFICE O/P EST LOW 20 MIN: CPT | Performed by: FAMILY MEDICINE

## 2023-10-03 RX ORDER — MECLIZINE HCL 12.5 MG/1
12.5 TABLET ORAL 3 TIMES DAILY PRN
Qty: 15 TABLET | Refills: 0 | Status: SHIPPED
Start: 2023-10-03 | End: 2023-10-03

## 2023-10-03 RX ORDER — TRAZODONE HYDROCHLORIDE 50 MG/1
50 TABLET ORAL NIGHTLY
Qty: 30 TABLET | Refills: 2 | Status: SHIPPED | OUTPATIENT
Start: 2023-10-03

## 2023-10-03 ASSESSMENT — ENCOUNTER SYMPTOMS
SORE THROAT: 0
VOMITING: 0
SWOLLEN GLANDS: 0
NAUSEA: 1

## 2024-01-30 ENCOUNTER — OFFICE VISIT (OUTPATIENT)
Dept: FAMILY MEDICINE CLINIC | Age: 59
End: 2024-01-30
Payer: COMMERCIAL

## 2024-01-30 VITALS
HEIGHT: 64 IN | DIASTOLIC BLOOD PRESSURE: 60 MMHG | OXYGEN SATURATION: 99 % | SYSTOLIC BLOOD PRESSURE: 110 MMHG | RESPIRATION RATE: 16 BRPM | HEART RATE: 57 BPM | WEIGHT: 152.2 LBS | BODY MASS INDEX: 25.99 KG/M2 | TEMPERATURE: 97.8 F

## 2024-01-30 DIAGNOSIS — R05.9 COUGH, UNSPECIFIED TYPE: ICD-10-CM

## 2024-01-30 DIAGNOSIS — J01.90 ACUTE NON-RECURRENT SINUSITIS, UNSPECIFIED LOCATION: Primary | ICD-10-CM

## 2024-01-30 DIAGNOSIS — R09.81 SINUS CONGESTION: ICD-10-CM

## 2024-01-30 LAB
INFLUENZA A ANTIBODY: NEGATIVE
INFLUENZA B ANTIBODY: NEGATIVE

## 2024-01-30 PROCEDURE — 99213 OFFICE O/P EST LOW 20 MIN: CPT | Performed by: NURSE PRACTITIONER

## 2024-01-30 PROCEDURE — 87804 INFLUENZA ASSAY W/OPTIC: CPT | Performed by: NURSE PRACTITIONER

## 2024-01-30 RX ORDER — AMOXICILLIN 875 MG/1
875 TABLET, COATED ORAL 2 TIMES DAILY
Qty: 20 TABLET | Refills: 0 | Status: SHIPPED | OUTPATIENT
Start: 2024-01-30 | End: 2024-02-09

## 2024-01-30 NOTE — PROGRESS NOTES
24  Isa Landa : 1965 Sex: female  Age 58 y.o.    Subjective:  Chief Complaint   Patient presents with    Cough     Moist, Yellow mucous    Nasal Congestion    Facial Pain     Frontal, Negative Covid this am       HPI:   Isa Landa , 58 y.o. female presents to the clinic for evaluation of sinus congestion x 5 days. The patient also reports sinus pressure, intermittent cough. The patient has taken Mucinex, Dayquil for symptoms. The patient reports worsening symptoms over time. The patient reports ill exposure (grand kids). The patient reports negative home COVID-19 test today. The patient denies headache, sore throat, rash, and fever. The patient also denies chest pain, abdominal pain, shortness of breath, wheezing, and nausea / vomiting / diarrhea.    ROS:   Unless otherwise stated in this report the patient's positive and negative responses for review of systems for constitutional, eyes, ENT, cardiovascular, respiratory, gastrointestinal, neurological, , musculoskeletal, and integument systems and related systems to the presenting problem are either stated in the history of present illness or were not pertinent or were negative for the symptoms and/or complaints related to the presenting medical problem.  Positives and pertinent negatives as per HPI.  All others reviewed and are negative.      PMH:     Past Medical History:   Diagnosis Date    Anemia, iron deficiency 2015    chronic    COVID-19 virus infection 2021    GERD (gastroesophageal reflux disease)     gerd    Heart palpitations 07/10/2007    and feelings og tachycardia    Intermittent lightheadedness 07/10/2007    mild with dizziness and no syncope    Mitral valve prolapse 1980    Osteoarthritis 2013    Carpometacarpal arthritis Rt thumb> Ly for years    Sinus bradycardia        Past Surgical History:   Procedure Laterality Date    ANKLE SURGERY Left 2021    [facility]    COLONOSCOPY  2015

## 2024-04-26 ENCOUNTER — E-VISIT (OUTPATIENT)
Dept: PRIMARY CARE CLINIC | Age: 59
End: 2024-04-26
Payer: COMMERCIAL

## 2024-04-26 DIAGNOSIS — B96.89 ACUTE BACTERIAL SINUSITIS: Primary | ICD-10-CM

## 2024-04-26 DIAGNOSIS — J01.90 ACUTE BACTERIAL SINUSITIS: Primary | ICD-10-CM

## 2024-04-26 PROCEDURE — 99213 OFFICE O/P EST LOW 20 MIN: CPT | Performed by: FAMILY MEDICINE

## 2024-04-26 RX ORDER — AMOXICILLIN 500 MG/1
500 CAPSULE ORAL 3 TIMES DAILY
Qty: 30 CAPSULE | Refills: 0 | Status: SHIPPED | OUTPATIENT
Start: 2024-04-26 | End: 2024-05-06

## 2024-04-26 ASSESSMENT — LIFESTYLE VARIABLES
PACKS_PER_DAY: 1
SMOKING_YEARS: 30
SMOKING_STATUS: NO, I'M A FORMER SMOKER

## 2024-04-26 NOTE — PROGRESS NOTES
Subjective: Patient with nasal congestion within yellow/green mucus from her nose.  She is having some sinus pressure with her upper teeth hurting..  Symptoms have been going on for 2 weeks.  No fever or chills.  She has been using over-the-counter Zyrtec and Afrin nasal spray with some relief.  She denies cough, sneezing, sore throat, swollen glands, body aches, shortness of breath, and fevers.    Assessment: Acute ethmoidal sinusitis    Plan: Amoxicillin 500 mg, #30, 1 pill 3 times a day for 10 days.  -Continue cigarette cessation.  -Follow-up if worsens or does not improve.  - stop afrin  - try otc advil cold and sinus.    11-20 minutes were spent on the digital evaluation and management of this patient.

## 2024-08-18 ASSESSMENT — PATIENT HEALTH QUESTIONNAIRE - PHQ9
SUM OF ALL RESPONSES TO PHQ QUESTIONS 1-9: 0
2. FEELING DOWN, DEPRESSED OR HOPELESS: NOT AT ALL
2. FEELING DOWN, DEPRESSED OR HOPELESS: NOT AT ALL
SUM OF ALL RESPONSES TO PHQ9 QUESTIONS 1 & 2: 0
1. LITTLE INTEREST OR PLEASURE IN DOING THINGS: NOT AT ALL
SUM OF ALL RESPONSES TO PHQ9 QUESTIONS 1 & 2: 0
1. LITTLE INTEREST OR PLEASURE IN DOING THINGS: NOT AT ALL
SUM OF ALL RESPONSES TO PHQ QUESTIONS 1-9: 0

## 2024-08-20 ENCOUNTER — OFFICE VISIT (OUTPATIENT)
Dept: PRIMARY CARE CLINIC | Age: 59
End: 2024-08-20
Payer: COMMERCIAL

## 2024-08-20 VITALS
HEART RATE: 78 BPM | BODY MASS INDEX: 26.09 KG/M2 | SYSTOLIC BLOOD PRESSURE: 122 MMHG | TEMPERATURE: 97 F | OXYGEN SATURATION: 99 % | WEIGHT: 152 LBS | DIASTOLIC BLOOD PRESSURE: 80 MMHG

## 2024-08-20 DIAGNOSIS — Z00.00 ANNUAL PHYSICAL EXAM: Primary | ICD-10-CM

## 2024-08-20 PROCEDURE — 99396 PREV VISIT EST AGE 40-64: CPT | Performed by: FAMILY MEDICINE

## 2024-08-20 SDOH — ECONOMIC STABILITY: FOOD INSECURITY: WITHIN THE PAST 12 MONTHS, YOU WORRIED THAT YOUR FOOD WOULD RUN OUT BEFORE YOU GOT MONEY TO BUY MORE.: NEVER TRUE

## 2024-08-20 SDOH — ECONOMIC STABILITY: FOOD INSECURITY: WITHIN THE PAST 12 MONTHS, THE FOOD YOU BOUGHT JUST DIDN'T LAST AND YOU DIDN'T HAVE MONEY TO GET MORE.: NEVER TRUE

## 2024-08-20 SDOH — ECONOMIC STABILITY: INCOME INSECURITY: HOW HARD IS IT FOR YOU TO PAY FOR THE VERY BASICS LIKE FOOD, HOUSING, MEDICAL CARE, AND HEATING?: NOT HARD AT ALL

## 2024-08-20 NOTE — PROGRESS NOTES
annual exam.    Diagnoses and all orders for this visit:    Annual physical exam  -     Lipid Panel; Future  -     CBC; Future  -     Comprehensive Metabolic Panel; Future    - recommend ct lung scan she will check with insur and let us know.   - recommend shingles vaccine    Return for based on lab results, or for acute problem.    Kar Strauss, DO

## 2024-08-22 DIAGNOSIS — Z00.00 ANNUAL PHYSICAL EXAM: ICD-10-CM

## 2024-08-22 LAB
ALBUMIN: 4.5 G/DL (ref 3.5–5.2)
ALP BLD-CCNC: 65 U/L (ref 35–104)
ALT SERPL-CCNC: 16 U/L (ref 0–32)
ANION GAP SERPL CALCULATED.3IONS-SCNC: 12 MMOL/L (ref 7–16)
AST SERPL-CCNC: 19 U/L (ref 0–31)
BILIRUB SERPL-MCNC: 0.5 MG/DL (ref 0–1.2)
BUN BLDV-MCNC: 20 MG/DL (ref 6–20)
CALCIUM SERPL-MCNC: 9.5 MG/DL (ref 8.6–10.2)
CHLORIDE BLD-SCNC: 102 MMOL/L (ref 98–107)
CHOLESTEROL, TOTAL: 248 MG/DL
CO2: 25 MMOL/L (ref 22–29)
CREAT SERPL-MCNC: 0.7 MG/DL (ref 0.5–1)
GFR, ESTIMATED: >90 ML/MIN/1.73M2
GLUCOSE BLD-MCNC: 90 MG/DL (ref 74–99)
HCT VFR BLD CALC: 43.1 % (ref 34–48)
HDLC SERPL-MCNC: 67 MG/DL
HEMOGLOBIN: 14.4 G/DL (ref 11.5–15.5)
LDL CHOLESTEROL: 162 MG/DL
MCH RBC QN AUTO: 31.9 PG (ref 26–35)
MCHC RBC AUTO-ENTMCNC: 33.4 G/DL (ref 32–34.5)
MCV RBC AUTO: 95.4 FL (ref 80–99.9)
PDW BLD-RTO: 12.9 % (ref 11.5–15)
PLATELET # BLD: 261 K/UL (ref 130–450)
PMV BLD AUTO: 9.7 FL (ref 7–12)
POTASSIUM SERPL-SCNC: 4.2 MMOL/L (ref 3.5–5)
RBC # BLD: 4.52 M/UL (ref 3.5–5.5)
SODIUM BLD-SCNC: 139 MMOL/L (ref 132–146)
TOTAL PROTEIN: 7.1 G/DL (ref 6.4–8.3)
TRIGL SERPL-MCNC: 94 MG/DL
VLDLC SERPL CALC-MCNC: 19 MG/DL
WBC # BLD: 5.8 K/UL (ref 4.5–11.5)

## 2024-08-26 ENCOUNTER — TELEPHONE (OUTPATIENT)
Dept: PRIMARY CARE CLINIC | Age: 59
End: 2024-08-26

## 2024-08-26 DIAGNOSIS — E78.2 MIXED HYPERLIPIDEMIA: Primary | ICD-10-CM

## 2024-08-26 NOTE — TELEPHONE ENCOUNTER
Left message cholesterol elevated with a 6.1% risk of ASCVD.  Recommend low-cholesterol diet and statin for her 6.1% ASCVD risk due to her past history of smoking.  Call if agrees.

## 2024-08-27 RX ORDER — METHYLPREDNISOLONE 4 MG
TABLET, DOSE PACK ORAL
Qty: 1 KIT | Refills: 0 | Status: SHIPPED | OUTPATIENT
Start: 2024-08-27

## 2024-08-27 RX ORDER — ATORVASTATIN CALCIUM 10 MG/1
10 TABLET, FILM COATED ORAL DAILY
Qty: 30 TABLET | Refills: 3 | Status: SHIPPED | OUTPATIENT
Start: 2024-08-27

## 2024-08-27 NOTE — TELEPHONE ENCOUNTER
Pt did call back and is agreeable to starting a medication for his cholesterol. She stated that she is very sensitive to medication and was looking at possible Lipitor if you agree.       She also wanted to know if you could send in something for her poison ivy. She states that it has gotten worse since seeing you. She states she has tried a lot of the OTC creams and Benadryl, but nothing is helping.      Pharmacy is [facility] [place]

## 2024-08-27 NOTE — TELEPHONE ENCOUNTER
Lipitor 10 mg called in for her high cholesterol.  Also Medrol Dosepak called in for poison ivy.  Follow-up if no improvement or worsens.

## 2024-08-29 ENCOUNTER — OFFICE VISIT (OUTPATIENT)
Dept: PRIMARY CARE CLINIC | Age: 59
End: 2024-08-29

## 2024-08-29 VITALS
OXYGEN SATURATION: 97 % | WEIGHT: 150 LBS | HEART RATE: 70 BPM | TEMPERATURE: 97.6 F | SYSTOLIC BLOOD PRESSURE: 132 MMHG | BODY MASS INDEX: 25.75 KG/M2 | DIASTOLIC BLOOD PRESSURE: 82 MMHG

## 2024-08-29 DIAGNOSIS — L23.7 POISON IVY DERMATITIS: Primary | ICD-10-CM

## 2024-08-29 RX ORDER — PREDNISONE 10 MG/1
TABLET ORAL
Qty: 21 TABLET | Refills: 0 | Status: SHIPPED | OUTPATIENT
Start: 2024-08-29

## 2024-08-29 RX ORDER — METHYLPREDNISOLONE ACETATE 40 MG/ML
40 INJECTION, SUSPENSION INTRA-ARTICULAR; INTRALESIONAL; INTRAMUSCULAR; SOFT TISSUE ONCE
Status: COMPLETED | OUTPATIENT
Start: 2024-08-29 | End: 2024-08-29

## 2024-08-29 RX ADMIN — METHYLPREDNISOLONE ACETATE 40 MG: 40 INJECTION, SUSPENSION INTRA-ARTICULAR; INTRALESIONAL; INTRAMUSCULAR; SOFT TISSUE at 15:20

## 2024-08-29 NOTE — PROGRESS NOTES
oriented to person, place, and time.   Psychiatric:         Mood and Affect: Mood normal.         ASSESSMENT AND PLAN:    Isa was seen today for poison ivy.    Diagnoses and all orders for this visit:    Poison ivy dermatitis  -     predniSONE (DELTASONE) 10 MG tablet; 4 daily for 2 days, then 3 daily for 2 days, then 2 daily for 2 days, then 1 daily for 2 days, then 1/2 daily for 2days.  -     methylPREDNISolone acetate (DEPO-MEDROL) injection 40 mg    - hold medrol dose marcela but start this prednisone.    Return if symptoms worsen or fail to improve, for or for acute problem.    aKr Strauss, DO

## 2024-12-23 DIAGNOSIS — E78.2 MIXED HYPERLIPIDEMIA: ICD-10-CM

## 2024-12-23 RX ORDER — ATORVASTATIN CALCIUM 10 MG/1
10 TABLET, FILM COATED ORAL DAILY
Qty: 30 TABLET | Refills: 1 | Status: SHIPPED | OUTPATIENT
Start: 2024-12-23

## 2025-01-10 ENCOUNTER — E-VISIT (OUTPATIENT)
Dept: PRIMARY CARE CLINIC | Age: 60
End: 2025-01-10

## 2025-01-10 DIAGNOSIS — J01.90 ACUTE BACTERIAL SINUSITIS: Primary | ICD-10-CM

## 2025-01-10 DIAGNOSIS — B96.89 ACUTE BACTERIAL SINUSITIS: Primary | ICD-10-CM

## 2025-01-10 RX ORDER — AZITHROMYCIN 250 MG/1
TABLET, FILM COATED ORAL
Qty: 1 PACKET | Refills: 0 | Status: SHIPPED | OUTPATIENT
Start: 2025-01-10

## 2025-01-10 ASSESSMENT — LIFESTYLE VARIABLES
SMOKING_STATUS: NO, I'M A FORMER SMOKER
PACKS_PER_DAY: 1
SMOKING_YEARS: 40

## 2025-01-10 NOTE — PROGRESS NOTES
Subjective: Patient having cough and sinus symptoms for 2 weeks plus.  She has thin yellow/green mucus from her nose.  She denies any fevers.  She has been trying Sudafed and nasal sprays without relief.  Patient does not smoke anymore.    Assessment: Acute bacterial ethmoidal sinusitis.    Plan: Z-Curtis No. 1 take as directed.  0 refills.  -Over-the-counter cough and cold meds as needed.  -Follow-up if no improvement or worsens.    11-20 minutes were spent on the digital evaluation and management of this patient.

## 2025-01-24 ENCOUNTER — OFFICE VISIT (OUTPATIENT)
Dept: PRIMARY CARE CLINIC | Age: 60
End: 2025-01-24

## 2025-01-24 VITALS
SYSTOLIC BLOOD PRESSURE: 122 MMHG | HEART RATE: 78 BPM | WEIGHT: 152 LBS | OXYGEN SATURATION: 98 % | BODY MASS INDEX: 26.09 KG/M2 | DIASTOLIC BLOOD PRESSURE: 80 MMHG | TEMPERATURE: 97 F

## 2025-01-24 DIAGNOSIS — J01.20 ACUTE NON-RECURRENT ETHMOIDAL SINUSITIS: Primary | ICD-10-CM

## 2025-01-24 RX ORDER — FLUTICASONE PROPIONATE 50 MCG
2 SPRAY, SUSPENSION (ML) NASAL DAILY
Qty: 16 G | Refills: 0 | Status: SHIPPED | OUTPATIENT
Start: 2025-01-24

## 2025-01-24 RX ORDER — CEFDINIR 300 MG/1
300 CAPSULE ORAL 2 TIMES DAILY
Qty: 20 CAPSULE | Refills: 0 | Status: SHIPPED | OUTPATIENT
Start: 2025-01-24 | End: 2025-02-03

## 2025-01-24 SDOH — ECONOMIC STABILITY: FOOD INSECURITY: WITHIN THE PAST 12 MONTHS, YOU WORRIED THAT YOUR FOOD WOULD RUN OUT BEFORE YOU GOT MONEY TO BUY MORE.: NEVER TRUE

## 2025-01-24 SDOH — ECONOMIC STABILITY: FOOD INSECURITY: WITHIN THE PAST 12 MONTHS, THE FOOD YOU BOUGHT JUST DIDN'T LAST AND YOU DIDN'T HAVE MONEY TO GET MORE.: NEVER TRUE

## 2025-01-24 ASSESSMENT — PATIENT HEALTH QUESTIONNAIRE - PHQ9
1. LITTLE INTEREST OR PLEASURE IN DOING THINGS: NOT AT ALL
SUM OF ALL RESPONSES TO PHQ QUESTIONS 1-9: 0
2. FEELING DOWN, DEPRESSED OR HOPELESS: NOT AT ALL
SUM OF ALL RESPONSES TO PHQ9 QUESTIONS 1 & 2: 0
SUM OF ALL RESPONSES TO PHQ QUESTIONS 1-9: 0

## 2025-01-24 ASSESSMENT — ENCOUNTER SYMPTOMS
SHORTNESS OF BREATH: 0
RHINORRHEA: 0
SORE THROAT: 0
SINUS PAIN: 0
COUGH: 0
SINUS PRESSURE: 1
SWOLLEN GLANDS: 0

## 2025-01-24 NOTE — PROGRESS NOTES
Isa Landa, a female of 59 y.o. came to the office 1/24/2025.     Patient Active Problem List   Diagnosis    Anemia, iron deficiency    GERD (gastroesophageal reflux disease)    Mitral valve prolapse    Osteoarthritis of both hands    Sinus bradycardia    Mixed hyperlipidemia          Sinusitis  This is a new problem. The current episode started more than 1 month ago (1). There has been no fever. Associated symptoms include chills, congestion (clear) and sinus pressure. Pertinent negatives include no coughing, ear pain, headaches, shortness of breath, sore throat or swollen glands. Past treatments include oral decongestants and nasal decongestants (claritin and z marcela.). The treatment provided mild relief.        Allergies   Allergen Reactions    Latex     Metamucil [Psyllium]        Current Outpatient Medications on File Prior to Visit   Medication Sig Dispense Refill    atorvastatin (LIPITOR) 10 MG tablet Take 1 tablet by mouth once daily 30 tablet 1    omeprazole (PRILOSEC) 20 MG capsule Take 1 capsule by mouth daily       No current facility-administered medications on file prior to visit.       Review of Systems   Constitutional:  Positive for chills and fatigue. Negative for fever.   HENT:  Positive for congestion (clear) and sinus pressure. Negative for ear pain, postnasal drip, rhinorrhea, sinus pain and sore throat.    Respiratory:  Negative for cough and shortness of breath.    Neurological:  Negative for headaches.     other review of systems reviewed and are negative    OBJECTIVE:  /80   Pulse 78   Temp 97 °F (36.1 °C)   Wt 68.9 kg (152 lb)   SpO2 98%   BMI 26.09 kg/m²      Physical Exam  Constitutional:       General: She is not in acute distress.  HENT:      Right Ear: Tympanic membrane normal.      Left Ear: Tympanic membrane normal.      Nose: Mucosal edema present. No rhinorrhea.      Right Sinus: No maxillary sinus tenderness or frontal sinus tenderness.      Left Sinus: No maxillary

## 2025-03-21 ENCOUNTER — HOSPITAL ENCOUNTER (INPATIENT)
Age: 60
LOS: 1 days | Discharge: HOME | DRG: 322 | End: 2025-03-22
Attending: EMERGENCY MEDICINE | Admitting: INTERNAL MEDICINE
Payer: COMMERCIAL

## 2025-03-21 ENCOUNTER — APPOINTMENT (OUTPATIENT)
Dept: GENERAL RADIOLOGY | Age: 60
DRG: 322 | End: 2025-03-21
Payer: COMMERCIAL

## 2025-03-21 DIAGNOSIS — I21.4 NSTEMI (NON-ST ELEVATED MYOCARDIAL INFARCTION) (HCC): Primary | ICD-10-CM

## 2025-03-21 DIAGNOSIS — R07.9 CHEST PAIN, UNSPECIFIED TYPE: ICD-10-CM

## 2025-03-21 LAB
ABO + RH BLD: NORMAL
ACTIVATED CLOTTING TIME, LOW RANGE: 260 SEC
ACTIVATED CLOTTING TIME, LOW RANGE: 267 SEC
ACTIVATED CLOTTING TIME, LOW RANGE: 362 SEC
ALBUMIN SERPL-MCNC: 4.2 G/DL (ref 3.5–5.2)
ALP SERPL-CCNC: 59 U/L (ref 35–104)
ALT SERPL-CCNC: 9 U/L (ref 0–32)
ANION GAP SERPL CALCULATED.3IONS-SCNC: 12 MMOL/L (ref 7–16)
ARM BAND NUMBER: NORMAL
AST SERPL-CCNC: 13 U/L (ref 0–31)
BASOPHILS # BLD: 0.06 K/UL (ref 0–0.2)
BASOPHILS NFR BLD: 1 % (ref 0–2)
BILIRUB DIRECT SERPL-MCNC: <0.2 MG/DL (ref 0–0.3)
BILIRUB INDIRECT SERPL-MCNC: NORMAL MG/DL (ref 0–1)
BILIRUB SERPL-MCNC: 0.2 MG/DL (ref 0–1.2)
BLOOD BANK SAMPLE EXPIRATION: NORMAL
BLOOD GROUP ANTIBODIES SERPL: NEGATIVE
BUN SERPL-MCNC: 22 MG/DL (ref 6–23)
CALCIUM SERPL-MCNC: 9.5 MG/DL (ref 8.6–10.2)
CHLORIDE SERPL-SCNC: 102 MMOL/L (ref 98–107)
CO2 SERPL-SCNC: 23 MMOL/L (ref 22–29)
CREAT SERPL-MCNC: 0.6 MG/DL (ref 0.5–1)
ECHO BSA: 1.69 M2
EOSINOPHIL # BLD: 0.42 K/UL (ref 0.05–0.5)
EOSINOPHILS RELATIVE PERCENT: 6 % (ref 0–6)
ERYTHROCYTE [DISTWIDTH] IN BLOOD BY AUTOMATED COUNT: 12.6 % (ref 11.5–15)
GFR, ESTIMATED: >90 ML/MIN/1.73M2
GLUCOSE SERPL-MCNC: 120 MG/DL (ref 74–99)
HCT VFR BLD AUTO: 38.9 % (ref 34–48)
HGB BLD-MCNC: 13.7 G/DL (ref 11.5–15.5)
IMM GRANULOCYTES # BLD AUTO: <0.03 K/UL (ref 0–0.58)
IMM GRANULOCYTES NFR BLD: 0 % (ref 0–5)
LYMPHOCYTES NFR BLD: 2.83 K/UL (ref 1.5–4)
LYMPHOCYTES RELATIVE PERCENT: 41 % (ref 20–42)
MAGNESIUM SERPL-MCNC: 1.9 MG/DL (ref 1.6–2.6)
MCH RBC QN AUTO: 31.4 PG (ref 26–35)
MCHC RBC AUTO-ENTMCNC: 35.2 G/DL (ref 32–34.5)
MCV RBC AUTO: 89 FL (ref 80–99.9)
MONOCYTES NFR BLD: 0.74 K/UL (ref 0.1–0.95)
MONOCYTES NFR BLD: 11 % (ref 2–12)
NEUTROPHILS NFR BLD: 42 % (ref 43–80)
NEUTS SEG NFR BLD: 2.89 K/UL (ref 1.8–7.3)
PARTIAL THROMBOPLASTIN TIME: 32.1 SEC (ref 24.5–35.1)
PARTIAL THROMBOPLASTIN TIME: 44.1 SEC (ref 24.5–35.1)
PLATELET # BLD AUTO: 318 K/UL (ref 130–450)
PMV BLD AUTO: 8.8 FL (ref 7–12)
POTASSIUM SERPL-SCNC: 4.1 MMOL/L (ref 3.5–5)
PROT SERPL-MCNC: 6.7 G/DL (ref 6.4–8.3)
RBC # BLD AUTO: 4.37 M/UL (ref 3.5–5.5)
SODIUM SERPL-SCNC: 137 MMOL/L (ref 132–146)
TROPONIN I SERPL HS-MCNC: 12 NG/L (ref 0–9)
TROPONIN I SERPL HS-MCNC: 121 NG/L (ref 0–9)
TROPONIN I SERPL HS-MCNC: 222 NG/L (ref 0–9)
WBC OTHER # BLD: 7 K/UL (ref 4.5–11.5)

## 2025-03-21 PROCEDURE — 027034Z DILATION OF CORONARY ARTERY, ONE ARTERY WITH DRUG-ELUTING INTRALUMINAL DEVICE, PERCUTANEOUS APPROACH: ICD-10-PCS | Performed by: INTERNAL MEDICINE

## 2025-03-21 PROCEDURE — 6370000000 HC RX 637 (ALT 250 FOR IP): Performed by: EMERGENCY MEDICINE

## 2025-03-21 PROCEDURE — APPSS60 APP SPLIT SHARED TIME 46-60 MINUTES: Performed by: NURSE PRACTITIONER

## 2025-03-21 PROCEDURE — 6360000002 HC RX W HCPCS: Performed by: INTERNAL MEDICINE

## 2025-03-21 PROCEDURE — 2709999900 HC NON-CHARGEABLE SUPPLY: Performed by: INTERNAL MEDICINE

## 2025-03-21 PROCEDURE — 2580000003 HC RX 258: Performed by: INTERNAL MEDICINE

## 2025-03-21 PROCEDURE — 80053 COMPREHEN METABOLIC PANEL: CPT

## 2025-03-21 PROCEDURE — C1887 CATHETER, GUIDING: HCPCS | Performed by: INTERNAL MEDICINE

## 2025-03-21 PROCEDURE — 6360000002 HC RX W HCPCS: Performed by: EMERGENCY MEDICINE

## 2025-03-21 PROCEDURE — 93005 ELECTROCARDIOGRAM TRACING: CPT | Performed by: EMERGENCY MEDICINE

## 2025-03-21 PROCEDURE — 92928 PRQ TCAT PLMT NTRAC ST 1 LES: CPT | Performed by: INTERNAL MEDICINE

## 2025-03-21 PROCEDURE — 6370000000 HC RX 637 (ALT 250 FOR IP): Performed by: INTERNAL MEDICINE

## 2025-03-21 PROCEDURE — 86850 RBC ANTIBODY SCREEN: CPT

## 2025-03-21 PROCEDURE — 2500000003 HC RX 250 WO HCPCS: Performed by: INTERNAL MEDICINE

## 2025-03-21 PROCEDURE — 86901 BLOOD TYPING SEROLOGIC RH(D): CPT

## 2025-03-21 PROCEDURE — 86900 BLOOD TYPING SEROLOGIC ABO: CPT

## 2025-03-21 PROCEDURE — 85730 THROMBOPLASTIN TIME PARTIAL: CPT

## 2025-03-21 PROCEDURE — C1894 INTRO/SHEATH, NON-LASER: HCPCS | Performed by: INTERNAL MEDICINE

## 2025-03-21 PROCEDURE — 99285 EMERGENCY DEPT VISIT HI MDM: CPT

## 2025-03-21 PROCEDURE — 82248 BILIRUBIN DIRECT: CPT

## 2025-03-21 PROCEDURE — 96365 THER/PROPH/DIAG IV INF INIT: CPT

## 2025-03-21 PROCEDURE — 71045 X-RAY EXAM CHEST 1 VIEW: CPT

## 2025-03-21 PROCEDURE — 2140000000 HC CCU INTERMEDIATE R&B

## 2025-03-21 PROCEDURE — 6370000000 HC RX 637 (ALT 250 FOR IP): Performed by: NURSE PRACTITIONER

## 2025-03-21 PROCEDURE — 96366 THER/PROPH/DIAG IV INF ADDON: CPT

## 2025-03-21 PROCEDURE — 6360000004 HC RX CONTRAST MEDICATION: Performed by: INTERNAL MEDICINE

## 2025-03-21 PROCEDURE — C1769 GUIDE WIRE: HCPCS | Performed by: INTERNAL MEDICINE

## 2025-03-21 PROCEDURE — 83735 ASSAY OF MAGNESIUM: CPT

## 2025-03-21 PROCEDURE — 4A023N7 MEASUREMENT OF CARDIAC SAMPLING AND PRESSURE, LEFT HEART, PERCUTANEOUS APPROACH: ICD-10-PCS | Performed by: INTERNAL MEDICINE

## 2025-03-21 PROCEDURE — 93458 L HRT ARTERY/VENTRICLE ANGIO: CPT | Performed by: INTERNAL MEDICINE

## 2025-03-21 PROCEDURE — C1874 STENT, COATED/COV W/DEL SYS: HCPCS | Performed by: INTERNAL MEDICINE

## 2025-03-21 PROCEDURE — B211YZZ FLUOROSCOPY OF MULTIPLE CORONARY ARTERIES USING OTHER CONTRAST: ICD-10-PCS | Performed by: INTERNAL MEDICINE

## 2025-03-21 PROCEDURE — 84484 ASSAY OF TROPONIN QUANT: CPT

## 2025-03-21 PROCEDURE — 85025 COMPLETE CBC W/AUTO DIFF WBC: CPT

## 2025-03-21 PROCEDURE — C1725 CATH, TRANSLUMIN NON-LASER: HCPCS | Performed by: INTERNAL MEDICINE

## 2025-03-21 PROCEDURE — 85347 COAGULATION TIME ACTIVATED: CPT

## 2025-03-21 DEVICE — STENT ONYXNG22518UX ONYX 2.25X18RX
Type: IMPLANTABLE DEVICE | Status: FUNCTIONAL
Brand: ONYX FRONTIER™

## 2025-03-21 RX ORDER — HEPARIN SODIUM 1000 [USP'U]/ML
60 INJECTION, SOLUTION INTRAVENOUS; SUBCUTANEOUS PRN
Status: DISCONTINUED | OUTPATIENT
Start: 2025-03-21 | End: 2025-03-21

## 2025-03-21 RX ORDER — MIDAZOLAM HYDROCHLORIDE 1 MG/ML
INJECTION, SOLUTION INTRAMUSCULAR; INTRAVENOUS PRN
Status: DISCONTINUED | OUTPATIENT
Start: 2025-03-21 | End: 2025-03-21 | Stop reason: HOSPADM

## 2025-03-21 RX ORDER — ACETAMINOPHEN 325 MG/1
650 TABLET ORAL EVERY 4 HOURS PRN
Status: DISCONTINUED | OUTPATIENT
Start: 2025-03-21 | End: 2025-03-22 | Stop reason: HOSPADM

## 2025-03-21 RX ORDER — CLOPIDOGREL 300 MG/1
600 TABLET, FILM COATED ORAL ONCE
Status: COMPLETED | OUTPATIENT
Start: 2025-03-22 | End: 2025-03-22

## 2025-03-21 RX ORDER — ONDANSETRON 2 MG/ML
4 INJECTION INTRAMUSCULAR; INTRAVENOUS EVERY 6 HOURS PRN
Status: DISCONTINUED | OUTPATIENT
Start: 2025-03-21 | End: 2025-03-22 | Stop reason: HOSPADM

## 2025-03-21 RX ORDER — ATORVASTATIN CALCIUM 40 MG/1
40 TABLET, FILM COATED ORAL DAILY
Status: DISCONTINUED | OUTPATIENT
Start: 2025-03-21 | End: 2025-03-21

## 2025-03-21 RX ORDER — NITROGLYCERIN 0.4 MG/1
0.4 TABLET SUBLINGUAL EVERY 5 MIN PRN
Status: DISCONTINUED | OUTPATIENT
Start: 2025-03-21 | End: 2025-03-22 | Stop reason: HOSPADM

## 2025-03-21 RX ORDER — ROSUVASTATIN CALCIUM 20 MG/1
20 TABLET, COATED ORAL NIGHTLY
Status: DISCONTINUED | OUTPATIENT
Start: 2025-03-21 | End: 2025-03-22 | Stop reason: HOSPADM

## 2025-03-21 RX ORDER — FENTANYL CITRATE 50 UG/ML
INJECTION, SOLUTION INTRAMUSCULAR; INTRAVENOUS PRN
Status: DISCONTINUED | OUTPATIENT
Start: 2025-03-21 | End: 2025-03-21 | Stop reason: HOSPADM

## 2025-03-21 RX ORDER — SODIUM CHLORIDE 9 MG/ML
INJECTION, SOLUTION INTRAVENOUS PRN
Status: DISCONTINUED | OUTPATIENT
Start: 2025-03-21 | End: 2025-03-22 | Stop reason: HOSPADM

## 2025-03-21 RX ORDER — VERAPAMIL HYDROCHLORIDE 2.5 MG/ML
INJECTION, SOLUTION INTRAVENOUS PRN
Status: DISCONTINUED | OUTPATIENT
Start: 2025-03-21 | End: 2025-03-21 | Stop reason: HOSPADM

## 2025-03-21 RX ORDER — 0.9 % SODIUM CHLORIDE 0.9 %
INTRAVENOUS SOLUTION INTRAVENOUS CONTINUOUS PRN
Status: COMPLETED | OUTPATIENT
Start: 2025-03-21 | End: 2025-03-21

## 2025-03-21 RX ORDER — POTASSIUM CHLORIDE 7.45 MG/ML
10 INJECTION INTRAVENOUS PRN
Status: DISCONTINUED | OUTPATIENT
Start: 2025-03-21 | End: 2025-03-22 | Stop reason: HOSPADM

## 2025-03-21 RX ORDER — SODIUM CHLORIDE 9 MG/ML
INJECTION, SOLUTION INTRAVENOUS CONTINUOUS
Status: ACTIVE | OUTPATIENT
Start: 2025-03-21 | End: 2025-03-22

## 2025-03-21 RX ORDER — SODIUM CHLORIDE 0.9 % (FLUSH) 0.9 %
5-40 SYRINGE (ML) INJECTION PRN
Status: DISCONTINUED | OUTPATIENT
Start: 2025-03-21 | End: 2025-03-22 | Stop reason: HOSPADM

## 2025-03-21 RX ORDER — ASPIRIN 81 MG/1
81 TABLET ORAL DAILY
Status: DISCONTINUED | OUTPATIENT
Start: 2025-03-22 | End: 2025-03-21

## 2025-03-21 RX ORDER — POTASSIUM CHLORIDE 1500 MG/1
40 TABLET, EXTENDED RELEASE ORAL PRN
Status: DISCONTINUED | OUTPATIENT
Start: 2025-03-21 | End: 2025-03-22 | Stop reason: HOSPADM

## 2025-03-21 RX ORDER — ASPIRIN 81 MG/1
324 TABLET, CHEWABLE ORAL ONCE
Status: COMPLETED | OUTPATIENT
Start: 2025-03-21 | End: 2025-03-21

## 2025-03-21 RX ORDER — ACETAMINOPHEN 650 MG/1
650 SUPPOSITORY RECTAL EVERY 6 HOURS PRN
Status: DISCONTINUED | OUTPATIENT
Start: 2025-03-21 | End: 2025-03-22 | Stop reason: HOSPADM

## 2025-03-21 RX ORDER — IOPAMIDOL 755 MG/ML
INJECTION, SOLUTION INTRAVASCULAR PRN
Status: DISCONTINUED | OUTPATIENT
Start: 2025-03-21 | End: 2025-03-21 | Stop reason: HOSPADM

## 2025-03-21 RX ORDER — HEPARIN SODIUM 10000 [USP'U]/100ML
5-30 INJECTION, SOLUTION INTRAVENOUS CONTINUOUS
Status: DISCONTINUED | OUTPATIENT
Start: 2025-03-21 | End: 2025-03-21

## 2025-03-21 RX ORDER — HEPARIN SODIUM 1000 [USP'U]/ML
30 INJECTION, SOLUTION INTRAVENOUS; SUBCUTANEOUS PRN
Status: DISCONTINUED | OUTPATIENT
Start: 2025-03-21 | End: 2025-03-21

## 2025-03-21 RX ORDER — ACETAMINOPHEN 325 MG/1
650 TABLET ORAL EVERY 6 HOURS PRN
Status: DISCONTINUED | OUTPATIENT
Start: 2025-03-21 | End: 2025-03-22 | Stop reason: HOSPADM

## 2025-03-21 RX ORDER — SODIUM CHLORIDE 0.9 % (FLUSH) 0.9 %
5-40 SYRINGE (ML) INJECTION EVERY 12 HOURS SCHEDULED
Status: DISCONTINUED | OUTPATIENT
Start: 2025-03-21 | End: 2025-03-22 | Stop reason: HOSPADM

## 2025-03-21 RX ORDER — METOPROLOL TARTRATE 50 MG
25 TABLET ORAL ONCE
Status: COMPLETED | OUTPATIENT
Start: 2025-03-21 | End: 2025-03-21

## 2025-03-21 RX ORDER — HEPARIN SODIUM 10000 [USP'U]/ML
INJECTION, SOLUTION INTRAVENOUS; SUBCUTANEOUS PRN
Status: DISCONTINUED | OUTPATIENT
Start: 2025-03-21 | End: 2025-03-21 | Stop reason: HOSPADM

## 2025-03-21 RX ORDER — NITROGLYCERIN 20 MG/100ML
INJECTION INTRAVENOUS PRN
Status: DISCONTINUED | OUTPATIENT
Start: 2025-03-21 | End: 2025-03-21 | Stop reason: HOSPADM

## 2025-03-21 RX ORDER — PANTOPRAZOLE SODIUM 40 MG/1
40 TABLET, DELAYED RELEASE ORAL
Status: DISCONTINUED | OUTPATIENT
Start: 2025-03-22 | End: 2025-03-22 | Stop reason: HOSPADM

## 2025-03-21 RX ORDER — HEPARIN SODIUM 1000 [USP'U]/ML
60 INJECTION, SOLUTION INTRAVENOUS; SUBCUTANEOUS ONCE
Status: COMPLETED | OUTPATIENT
Start: 2025-03-21 | End: 2025-03-21

## 2025-03-21 RX ORDER — POLYETHYLENE GLYCOL 3350 17 G/17G
17 POWDER, FOR SOLUTION ORAL DAILY PRN
Status: DISCONTINUED | OUTPATIENT
Start: 2025-03-21 | End: 2025-03-22 | Stop reason: HOSPADM

## 2025-03-21 RX ORDER — ONDANSETRON 4 MG/1
4 TABLET, ORALLY DISINTEGRATING ORAL EVERY 8 HOURS PRN
Status: DISCONTINUED | OUTPATIENT
Start: 2025-03-21 | End: 2025-03-22 | Stop reason: HOSPADM

## 2025-03-21 RX ORDER — ASPIRIN 81 MG/1
81 TABLET ORAL DAILY
Status: DISCONTINUED | OUTPATIENT
Start: 2025-03-22 | End: 2025-03-22 | Stop reason: HOSPADM

## 2025-03-21 RX ORDER — ALPRAZOLAM 0.25 MG
0.25 TABLET ORAL 3 TIMES DAILY PRN
Status: DISCONTINUED | OUTPATIENT
Start: 2025-03-21 | End: 2025-03-22 | Stop reason: HOSPADM

## 2025-03-21 RX ORDER — MAGNESIUM SULFATE IN WATER 40 MG/ML
2000 INJECTION, SOLUTION INTRAVENOUS PRN
Status: DISCONTINUED | OUTPATIENT
Start: 2025-03-21 | End: 2025-03-22 | Stop reason: HOSPADM

## 2025-03-21 RX ORDER — LORAZEPAM 1 MG/1
1 TABLET ORAL ONCE
Status: COMPLETED | OUTPATIENT
Start: 2025-03-21 | End: 2025-03-21

## 2025-03-21 RX ORDER — CLOPIDOGREL BISULFATE 75 MG/1
75 TABLET ORAL DAILY
Status: DISCONTINUED | OUTPATIENT
Start: 2025-03-23 | End: 2025-03-22 | Stop reason: HOSPADM

## 2025-03-21 RX ADMIN — TICAGRELOR 90 MG: 90 TABLET ORAL at 20:28

## 2025-03-21 RX ADMIN — METOPROLOL TARTRATE 25 MG: 50 TABLET, FILM COATED ORAL at 09:43

## 2025-03-21 RX ADMIN — SODIUM CHLORIDE, PRESERVATIVE FREE 10 ML: 5 INJECTION INTRAVENOUS at 20:31

## 2025-03-21 RX ADMIN — ROSUVASTATIN 20 MG: 20 TABLET, FILM COATED ORAL at 20:28

## 2025-03-21 RX ADMIN — HEPARIN SODIUM 12 UNITS/KG/HR: 10000 INJECTION, SOLUTION INTRAVENOUS at 09:58

## 2025-03-21 RX ADMIN — ACETAMINOPHEN 650 MG: 325 TABLET ORAL at 17:53

## 2025-03-21 RX ADMIN — NITROGLYCERIN 0.4 MG: 0.4 TABLET, ORALLY DISINTEGRATING SUBLINGUAL at 08:00

## 2025-03-21 RX ADMIN — ASPIRIN 81 MG CHEWABLE TABLET 324 MG: 81 TABLET CHEWABLE at 07:59

## 2025-03-21 RX ADMIN — LORAZEPAM 1 MG: 1 TABLET ORAL at 11:09

## 2025-03-21 RX ADMIN — SODIUM CHLORIDE: 0.9 INJECTION, SOLUTION INTRAVENOUS at 18:04

## 2025-03-21 RX ADMIN — HEPARIN SODIUM 3900 UNITS: 1000 INJECTION INTRAVENOUS; SUBCUTANEOUS at 09:48

## 2025-03-21 ASSESSMENT — PAIN - FUNCTIONAL ASSESSMENT
PAIN_FUNCTIONAL_ASSESSMENT: NONE - DENIES PAIN
PAIN_FUNCTIONAL_ASSESSMENT: NONE - DENIES PAIN
PAIN_FUNCTIONAL_ASSESSMENT: ACTIVITIES ARE NOT PREVENTED
PAIN_FUNCTIONAL_ASSESSMENT: 0-10
PAIN_FUNCTIONAL_ASSESSMENT: 0-10
PAIN_FUNCTIONAL_ASSESSMENT: ACTIVITIES ARE NOT PREVENTED

## 2025-03-21 ASSESSMENT — PAIN DESCRIPTION - FREQUENCY
FREQUENCY: INTERMITTENT
FREQUENCY: CONTINUOUS

## 2025-03-21 ASSESSMENT — PAIN SCALES - GENERAL
PAINLEVEL_OUTOF10: 5
PAINLEVEL_OUTOF10: 0
PAINLEVEL_OUTOF10: 2

## 2025-03-21 ASSESSMENT — PAIN DESCRIPTION - LOCATION
LOCATION: CHEST
LOCATION: HEAD
LOCATION: HEAD
LOCATION: CHEST;ARM

## 2025-03-21 ASSESSMENT — PAIN DESCRIPTION - ORIENTATION
ORIENTATION: OTHER (COMMENT)
ORIENTATION: MID
ORIENTATION: MID

## 2025-03-21 ASSESSMENT — PAIN DESCRIPTION - DESCRIPTORS
DESCRIPTORS: SPASM;TIGHTNESS
DESCRIPTORS: ACHING;PRESSURE;SQUEEZING
DESCRIPTORS: PRESSURE;OTHER (COMMENT)

## 2025-03-21 ASSESSMENT — PAIN DESCRIPTION - PAIN TYPE
TYPE: ACUTE PAIN
TYPE: ACUTE PAIN

## 2025-03-21 ASSESSMENT — PAIN DESCRIPTION - ONSET
ONSET: ON-GOING
ONSET: GRADUAL

## 2025-03-21 NOTE — ED PROVIDER NOTES
[facility] EMERGENCY DEPARTMENT  EMERGENCY DEPARTMENT ENCOUNTER        Pt Name: Isa Landa  MRN: [id number]  Birthdate 1965  Date of evaluation: 3/21/2025  Provider: Viv Rivera DO  PCP: Kar Strauss DO  Note Started: 7:52 AM EDT 3/21/25    CHIEF COMPLAINT       Chief Complaint   Patient presents with    Chest Pain     Midsternal chest pain with radiation to lt arm since 615 am, now radiating to throat and to lt arm       HISTORY OF PRESENT ILLNESS: 1 or more Elements   History From: patient    Limitations to history : None    Isa Landa is a 59 y.o. female who presents with midsternal chest pains radiating to her throat (like a lump in her throat) beginning this morning at 6:15am.  She reports she was writing it off to GERD but then her left arm started hurting so she presents to the emergency department for evaluation.  She advises that the symptoms have started to improve.  She describes the pain as \"weird\" rather than any other character.  she denies associated symptoms of shortness of breath, diaphoresis, nausea, edema, recent illnesses.  She does advise that she has a significant family history of coronary artery disease early in life.  She advises she does have high cholesterol, currently unmedicated as the statin she was on was causing rhinitis.  She advises she is not a smoker at this time.  The complaint has been persistent, moderate in severity, and worsened by nothing.  Patient denies fever/chills, sore throat, cough, congestion, shortness of breath, edema, headache, visual disturbances, focal paresthesias, focal weakness, abdominal pain, nausea, vomiting, diarrhea, constipation, dysuria, hematuria, trauma, neck or back pain, rash or other complaints.      Nursing Notes were all reviewed and agreed with or any disagreements were addressed in the HPI.    REVIEW OF SYSTEMS :           Positives and Pertinent negatives as per HPI.     SURGICAL HISTORY

## 2025-03-21 NOTE — ED NOTES
Called [facility] to give report, spoke to Amelia ARAUJO.   [facility] here now for transport

## 2025-03-21 NOTE — PROGRESS NOTES
Contacted dr flores (on for [facility]) regarding development of hematoma. He states to remove band and hold pressure manually for 15 min. Will update Redd and obtain further orders after 15 min

## 2025-03-21 NOTE — PROGRESS NOTES
Manual pressure held to site for 15 min per 's orders. Bleeding stopped, bandage and arm board applied to R radial.    Lenore Ortiz RN

## 2025-03-21 NOTE — CONSULTS
Inpatient Cardiology Consultation      Reason for Consult:  NSTEMI    Consulting Physician: Dr Gonzalez    Requesting Physician:  Dr Cerda    Date of Consultation: 3/21/2025    HISTORY OF PRESENT ILLNESS: 59-year-old  female not previously known to a cardiologist.    Getting ready for work (packing lunch, making coffee) around 0615 developed mid-sternal CP (tightness rated an 8 out of 10), felt a \"lump\" in her throat,\" L shoulder achy down into forearm. No SOB, dizziness, palpitations. Woke up her boyfriend who took her to [facility] ED.      [facility] ED 3/21/2025 /100 HR 70 SR afebrile O2 saturation 63% on RA    CXR no CHF or infiltrate.  Borderline cardiomegaly    Troponin 12>121>222, Bun/Cr 22/0.6, K+ 4.1, Na 137, LFT's/CBC WNL.     ED Medications: 3 and 25 mg ASA, nitro SL, heparin bolus/gtt, Lopressor 25 mg x dose    After receiving 4 baby ASA and Nitro SL in ED> CP resolved and did not return.    ASA 81 mg QD, Lipitor 40 mg QD ordered(not on at home)    Currently /59 HR 60 SR afebrile O2 saturation 98% on RA.    Patient has remained CP free.  Resting in bed, verbalizing no complaint.    Discussed rationale for LHC, patient given time to ask questions, all questions answered to her satisfaction.    Will proceed with LHC with Dr Gonzalez    TTE ordered to be done 3/22/2025    Please note: past medical records were reviewed per electronic medical record (EMR) - see detailed reports under Past Medical/ Surgical History.   Past Medical History:    Hx HLD previous on Lipitor>12/2024 got ill with respiratory illness> thought th Lipitor caused the URI symptoms> tried Lipitor again and nasal symptoms happened again> stopped Lipitor. Plan to place on Crestor  4/2025.  40 pack year smoker quit in 2022  Iron deficiency anemia  GERD on omeprazole  9/2021 + COVID-19  OA (wrists)  Hx R knee bursitis with history of injections  Denies HTN, Thyroid disorder, PUD, DVT/PE, or carcinoma      Past Surgical  cardiac surgery. Patient verbalized understanding and is agreeable to proceed.  IV heparin, aspirin  Serial troponin  Further cardiac recommendations will be forthcoming pending her clinical course and the findings of her cardiac catheterization    I have reviewed my findings and recommendations with patient    Thank you for the consult  Electronically signed by Viji Gonzalez MD on 3/21/2025 at 8:44 PM    All of the above was discussed with the patient  reviewing the above stated recommendations. I directly participated in the medical-decision making, ordering tests, and medication adjustments as documented today. I contributed >50% to the overall encounter time including face-to-face time providing counseling and or coordination of care with the other providers, reviewing records/tests, counseling/education of the patient, ordering medications/tests/procedures, coordinating care, and documenting clinical information in the EHR. I also discussed the differential diagnosis and all of the proposed management plans with the patient.    NOTE: This report was transcribed using voice recognition software. Every effort was made to ensure accuracy; however, inadvertent computerized transcription errors may be present

## 2025-03-22 ENCOUNTER — APPOINTMENT (OUTPATIENT)
Age: 60
DRG: 322 | End: 2025-03-22
Payer: COMMERCIAL

## 2025-03-22 VITALS
TEMPERATURE: 98.7 F | SYSTOLIC BLOOD PRESSURE: 125 MMHG | DIASTOLIC BLOOD PRESSURE: 79 MMHG | WEIGHT: 141.09 LBS | RESPIRATION RATE: 18 BRPM | OXYGEN SATURATION: 99 % | HEART RATE: 61 BPM | HEIGHT: 63 IN | BODY MASS INDEX: 25 KG/M2

## 2025-03-22 LAB
ANION GAP SERPL CALCULATED.3IONS-SCNC: 13 MMOL/L (ref 7–16)
BUN SERPL-MCNC: 14 MG/DL (ref 6–23)
CALCIUM SERPL-MCNC: 8.5 MG/DL (ref 8.6–10.2)
CHLORIDE SERPL-SCNC: 107 MMOL/L (ref 98–107)
CHOLEST SERPL-MCNC: 156 MG/DL
CO2 SERPL-SCNC: 20 MMOL/L (ref 22–29)
CREAT SERPL-MCNC: 0.6 MG/DL (ref 0.5–1)
ECHO AO ASC DIAM: 3 CM
ECHO AO ASCENDING AORTA INDEX: 1.8 CM/M2
ECHO AV AREA PEAK VELOCITY: 2.1 CM2
ECHO AV AREA VTI: 2 CM2
ECHO AV AREA/BSA PEAK VELOCITY: 1.3 CM2/M2
ECHO AV AREA/BSA VTI: 1.2 CM2/M2
ECHO AV CUSP MM: 1.8 CM
ECHO AV MEAN GRADIENT: 5 MMHG
ECHO AV MEAN VELOCITY: 1.1 M/S
ECHO AV PEAK GRADIENT: 8 MMHG
ECHO AV PEAK VELOCITY: 1.5 M/S
ECHO AV VELOCITY RATIO: 0.67
ECHO AV VTI: 36.1 CM
ECHO BSA: 1.69 M2
ECHO EST RA PRESSURE: 3 MMHG
ECHO LA DIAMETER INDEX: 1.98 CM/M2
ECHO LA DIAMETER: 3.3 CM
ECHO LA VOL A-L A2C: 67 ML (ref 22–52)
ECHO LA VOL A-L A4C: 34 ML (ref 22–52)
ECHO LA VOL BP: 49 ML (ref 22–52)
ECHO LA VOL MOD A2C: 65 ML (ref 22–52)
ECHO LA VOL MOD A4C: 32 ML (ref 22–52)
ECHO LA VOL/BSA BIPLANE: 29 ML/M2 (ref 16–34)
ECHO LA VOLUME AREA LENGTH: 52 ML
ECHO LA VOLUME INDEX A-L A2C: 40 ML/M2 (ref 16–34)
ECHO LA VOLUME INDEX A-L A4C: 20 ML/M2 (ref 16–34)
ECHO LA VOLUME INDEX AREA LENGTH: 31 ML/M2 (ref 16–34)
ECHO LA VOLUME INDEX MOD A2C: 39 ML/M2 (ref 16–34)
ECHO LA VOLUME INDEX MOD A4C: 19 ML/M2 (ref 16–34)
ECHO LV EDV A2C: 105 ML
ECHO LV EDV A4C: 98 ML
ECHO LV EDV BP: 104 ML (ref 56–104)
ECHO LV EDV INDEX A4C: 59 ML/M2
ECHO LV EDV INDEX BP: 62 ML/M2
ECHO LV EDV NDEX A2C: 63 ML/M2
ECHO LV EF PHYSICIAN: 40 %
ECHO LV EJECTION FRACTION A2C: 49 %
ECHO LV EJECTION FRACTION A4C: 40 %
ECHO LV ESV A2C: 54 ML
ECHO LV ESV A4C: 58 ML
ECHO LV ESV BP: 56 ML (ref 19–49)
ECHO LV ESV INDEX A2C: 32 ML/M2
ECHO LV ESV INDEX A4C: 35 ML/M2
ECHO LV ESV INDEX BP: 34 ML/M2
ECHO LV FRACTIONAL SHORTENING: 33 % (ref 28–44)
ECHO LV INTERNAL DIMENSION DIASTOLE INDEX: 2.69 CM/M2
ECHO LV INTERNAL DIMENSION DIASTOLIC: 4.5 CM (ref 3.9–5.3)
ECHO LV INTERNAL DIMENSION SYSTOLIC INDEX: 1.8 CM/M2
ECHO LV INTERNAL DIMENSION SYSTOLIC: 3 CM
ECHO LV ISOVOLUMETRIC RELAXATION TIME (IVRT): 72.3 MS
ECHO LV IVSD: 0.7 CM (ref 0.6–0.9)
ECHO LV IVSS: 1.6 CM
ECHO LV MASS 2D: 113.6 G (ref 67–162)
ECHO LV MASS INDEX 2D: 68 G/M2 (ref 43–95)
ECHO LV POSTERIOR WALL DIASTOLIC: 0.9 CM (ref 0.6–0.9)
ECHO LV POSTERIOR WALL SYSTOLIC: 1.3 CM
ECHO LV RELATIVE WALL THICKNESS RATIO: 0.4
ECHO LVOT AREA: 3.1 CM2
ECHO LVOT AV VTI INDEX: 0.66
ECHO LVOT DIAM: 2 CM
ECHO LVOT MEAN GRADIENT: 2 MMHG
ECHO LVOT PEAK GRADIENT: 4 MMHG
ECHO LVOT PEAK VELOCITY: 1 M/S
ECHO LVOT STROKE VOLUME INDEX: 45.1 ML/M2
ECHO LVOT SV: 75.4 ML
ECHO LVOT VTI: 24 CM
ECHO MV "A" WAVE DURATION: 99 MSEC
ECHO MV A VELOCITY: 0.98 M/S
ECHO MV AREA PHT: 2.2 CM2
ECHO MV AREA VTI: 2.2 CM2
ECHO MV E DECELERATION TIME (DT): 205.7 MS
ECHO MV E VELOCITY: 1.01 M/S
ECHO MV E/A RATIO: 1.03
ECHO MV LVOT VTI INDEX: 1.43
ECHO MV MAX VELOCITY: 1.1 M/S
ECHO MV MEAN GRADIENT: 2 MMHG
ECHO MV MEAN VELOCITY: 0.7 M/S
ECHO MV PEAK GRADIENT: 5 MMHG
ECHO MV PRESSURE HALF TIME (PHT): 99.9 MS
ECHO MV VTI: 34.3 CM
ECHO PV MAX VELOCITY: 0.8 M/S
ECHO PV MEAN GRADIENT: 2 MMHG
ECHO PV MEAN VELOCITY: 0.6 M/S
ECHO PV PEAK GRADIENT: 2 MMHG
ECHO PV VTI: 18.7 CM
ECHO PVEIN A DURATION: 114.2 MS
ECHO PVEIN A VELOCITY: 0.3 M/S
ECHO PVEIN PEAK D VELOCITY: 0.3 M/S
ECHO PVEIN PEAK S VELOCITY: 0.5 M/S
ECHO PVEIN S/D RATIO: 1.7
ECHO RIGHT VENTRICULAR SYSTOLIC PRESSURE (RVSP): 28 MMHG
ECHO RV INTERNAL DIMENSION: 3.5 CM
ECHO RV LONGITUDINAL DIMENSION: 6.6 CM
ECHO RV MID DIMENSION: 3.1 CM
ECHO RV TAPSE: 2.8 CM (ref 1.7–?)
ECHO TV REGURGITANT MAX VELOCITY: 2.5 M/S
ECHO TV REGURGITANT PEAK GRADIENT: 25 MMHG
EKG ATRIAL RATE: 58 BPM
EKG P AXIS: 73 DEGREES
EKG P-R INTERVAL: 166 MS
EKG Q-T INTERVAL: 488 MS
EKG QRS DURATION: 92 MS
EKG QTC CALCULATION (BAZETT): 479 MS
EKG R AXIS: 46 DEGREES
EKG T AXIS: 94 DEGREES
EKG VENTRICULAR RATE: 58 BPM
ERYTHROCYTE [DISTWIDTH] IN BLOOD BY AUTOMATED COUNT: 12.8 % (ref 11.5–15)
GFR, ESTIMATED: >90 ML/MIN/1.73M2
GLUCOSE SERPL-MCNC: 96 MG/DL (ref 74–99)
HBA1C MFR BLD: 5.9 % (ref 4–5.6)
HCT VFR BLD AUTO: 35.1 % (ref 34–48)
HDLC SERPL-MCNC: 40 MG/DL
HGB BLD-MCNC: 11.5 G/DL (ref 11.5–15.5)
LDLC SERPL CALC-MCNC: 88 MG/DL
MAGNESIUM SERPL-MCNC: 1.9 MG/DL (ref 1.6–2.6)
MCH RBC QN AUTO: 30.6 PG (ref 26–35)
MCHC RBC AUTO-ENTMCNC: 32.8 G/DL (ref 32–34.5)
MCV RBC AUTO: 93.4 FL (ref 80–99.9)
PLATELET # BLD AUTO: 269 K/UL (ref 130–450)
PMV BLD AUTO: 9.3 FL (ref 7–12)
POTASSIUM SERPL-SCNC: 4 MMOL/L (ref 3.5–5)
RBC # BLD AUTO: 3.76 M/UL (ref 3.5–5.5)
SODIUM SERPL-SCNC: 140 MMOL/L (ref 132–146)
T4 FREE SERPL-MCNC: 1.1 NG/DL (ref 0.9–1.7)
TRIGL SERPL-MCNC: 140 MG/DL
TSH SERPL DL<=0.05 MIU/L-ACNC: 3.93 UIU/ML (ref 0.27–4.2)
VLDLC SERPL CALC-MCNC: 28 MG/DL
WBC OTHER # BLD: 7.9 K/UL (ref 4.5–11.5)

## 2025-03-22 PROCEDURE — 99232 SBSQ HOSP IP/OBS MODERATE 35: CPT | Performed by: INTERNAL MEDICINE

## 2025-03-22 PROCEDURE — 6370000000 HC RX 637 (ALT 250 FOR IP): Performed by: INTERNAL MEDICINE

## 2025-03-22 PROCEDURE — 84439 ASSAY OF FREE THYROXINE: CPT

## 2025-03-22 PROCEDURE — 36415 COLL VENOUS BLD VENIPUNCTURE: CPT

## 2025-03-22 PROCEDURE — 2500000003 HC RX 250 WO HCPCS: Performed by: INTERNAL MEDICINE

## 2025-03-22 PROCEDURE — 80061 LIPID PANEL: CPT

## 2025-03-22 PROCEDURE — 83036 HEMOGLOBIN GLYCOSYLATED A1C: CPT

## 2025-03-22 PROCEDURE — 80048 BASIC METABOLIC PNL TOTAL CA: CPT

## 2025-03-22 PROCEDURE — 93306 TTE W/DOPPLER COMPLETE: CPT

## 2025-03-22 PROCEDURE — 85027 COMPLETE CBC AUTOMATED: CPT

## 2025-03-22 PROCEDURE — 93306 TTE W/DOPPLER COMPLETE: CPT | Performed by: INTERNAL MEDICINE

## 2025-03-22 PROCEDURE — 83735 ASSAY OF MAGNESIUM: CPT

## 2025-03-22 PROCEDURE — 93005 ELECTROCARDIOGRAM TRACING: CPT | Performed by: NURSE PRACTITIONER

## 2025-03-22 PROCEDURE — 84443 ASSAY THYROID STIM HORMONE: CPT

## 2025-03-22 RX ORDER — CLOPIDOGREL BISULFATE 75 MG/1
75 TABLET ORAL DAILY
Qty: 30 TABLET | Refills: 3 | Status: SHIPPED | OUTPATIENT
Start: 2025-03-23 | End: 2025-03-26

## 2025-03-22 RX ORDER — CARVEDILOL 3.12 MG/1
3.12 TABLET ORAL 2 TIMES DAILY WITH MEALS
Qty: 60 TABLET | Refills: 3 | Status: SHIPPED | OUTPATIENT
Start: 2025-03-22 | End: 2025-03-26

## 2025-03-22 RX ORDER — ROSUVASTATIN CALCIUM 20 MG/1
20 TABLET, COATED ORAL NIGHTLY
Qty: 30 TABLET | Refills: 3 | Status: SHIPPED | OUTPATIENT
Start: 2025-03-22 | End: 2025-03-26

## 2025-03-22 RX ORDER — ASPIRIN 81 MG/1
81 TABLET ORAL DAILY
COMMUNITY
Start: 2025-03-23 | End: 2025-03-26

## 2025-03-22 RX ORDER — NITROGLYCERIN 0.4 MG/1
0.4 TABLET SUBLINGUAL EVERY 5 MIN PRN
Qty: 25 TABLET | Refills: 3 | Status: SHIPPED | OUTPATIENT
Start: 2025-03-22

## 2025-03-22 RX ORDER — CARVEDILOL 3.12 MG/1
3.12 TABLET ORAL 2 TIMES DAILY WITH MEALS
Status: DISCONTINUED | OUTPATIENT
Start: 2025-03-22 | End: 2025-03-22 | Stop reason: HOSPADM

## 2025-03-22 RX ADMIN — CARVEDILOL 3.12 MG: 3.12 TABLET, FILM COATED ORAL at 08:56

## 2025-03-22 RX ADMIN — SACUBITRIL AND VALSARTAN 1 TABLET: 24; 26 TABLET, FILM COATED ORAL at 08:56

## 2025-03-22 RX ADMIN — CLOPIDOGREL BISULFATE 600 MG: 300 TABLET, FILM COATED ORAL at 08:55

## 2025-03-22 RX ADMIN — PANTOPRAZOLE SODIUM 40 MG: 40 TABLET, DELAYED RELEASE ORAL at 05:40

## 2025-03-22 RX ADMIN — ASPIRIN 81 MG: 81 TABLET, COATED ORAL at 08:56

## 2025-03-22 RX ADMIN — SODIUM CHLORIDE, PRESERVATIVE FREE 10 ML: 5 INJECTION INTRAVENOUS at 08:58

## 2025-03-22 RX ADMIN — ACETAMINOPHEN 650 MG: 325 TABLET ORAL at 05:40

## 2025-03-22 ASSESSMENT — PAIN SCALES - WONG BAKER: WONGBAKER_NUMERICALRESPONSE: NO HURT

## 2025-03-22 ASSESSMENT — PAIN SCALES - GENERAL: PAINLEVEL_OUTOF10: 2

## 2025-03-22 NOTE — PROGRESS NOTES
Non-ST elevation MI, mid LAD stenosis, deployment of 2.25 x 22 mm Fausto frontier drug-eluting stent, mild RCA disease

## 2025-03-22 NOTE — PLAN OF CARE
Problem: Discharge Planning  Goal: Discharge to home or other facility with appropriate resources  3/22/2025 0931 by Jean Marie Marques RN  Outcome: Progressing  3/21/2025 2349 by Lenore Cooper RN  Outcome: Progressing     Problem: Safety - Adult  Goal: Free from fall injury  3/22/2025 0931 by Jean Marie Marques RN  Outcome: Progressing  3/21/2025 2349 by Lenore Cooper RN  Outcome: Progressing     Problem: ABCDS Injury Assessment  Goal: Absence of physical injury  3/22/2025 0931 by Jean Marie Marques RN  Outcome: Progressing  3/21/2025 2349 by Lenore Cooper RN  Outcome: Progressing     Problem: Pain  Goal: Verbalizes/displays adequate comfort level or baseline comfort level  3/22/2025 0931 by Jean Marie Marques RN  Outcome: Progressing  3/21/2025 2349 by Lenore Cooper RN  Outcome: Progressing

## 2025-03-22 NOTE — PLAN OF CARE
Problem: Discharge Planning  Goal: Discharge to home or other facility with appropriate resources  3/22/2025 1346 by Jean Marie Marques RN  Outcome: Completed  3/22/2025 0931 by Jean Marie Marques RN  Outcome: Progressing  Flowsheets (Taken 3/22/2025 0815)  Discharge to home or other facility with appropriate resources:   Identify barriers to discharge with patient and caregiver   Arrange for needed discharge resources and transportation as appropriate   Identify discharge learning needs (meds, wound care, etc)   Refer to discharge planning if patient needs post-hospital services based on physician order or complex needs related to functional status, cognitive ability or social support system  3/21/2025 2349 by Lenore Cooper RN  Outcome: Progressing     Problem: Safety - Adult  Goal: Free from fall injury  3/22/2025 1346 by Jean Marie Marques RN  Outcome: Completed  3/22/2025 0931 by Jean Marie Marques RN  Outcome: Progressing  3/21/2025 2349 by Lenore Cooper RN  Outcome: Progressing     Problem: ABCDS Injury Assessment  Goal: Absence of physical injury  3/22/2025 1346 by Jean Marie Marques RN  Outcome: Completed  3/22/2025 0931 by Jean Marie Marques RN  Outcome: Progressing  3/21/2025 2349 by Lenore Cooper RN  Outcome: Progressing     Problem: Pain  Goal: Verbalizes/displays adequate comfort level or baseline comfort level  3/22/2025 1346 by Jean Marie Marques RN  Outcome: Completed  3/22/2025 0931 by Jean Marie Marques RN  Outcome: Progressing  3/21/2025 2349 by Lenore Cooper RN  Outcome: Progressing

## 2025-03-22 NOTE — CONSULTS
Met with patient and discussed that their physician has ordered a referral to our outpatient Phase II Cardiac Rehabilitation program. Reviewed the benefits of cardiac rehabilitation based on their diagnosis and personal risk factors. Patient demonstrates moderate interest in Cardiac Rehabilitation at this time.  Cardiac Rehabilitation brochure provided to patient/family. The Cardiac Rehabilitation Program has been provided the patient's referral information and pertinent patient details and history. The patient may call [facility] at [phone] for additional information or questions. Contact information for [facility] and other choices close to the patient's residence have been provided in the discharge instructions so that the patient may call and schedule an appointment when cleared by their physician. Thank you for the referral.

## 2025-03-22 NOTE — DISCHARGE INSTRUCTIONS
Cardiac Rehabilitation: Discharge instructions        Cardiac rehabilitation is a program for people who have a heart problem, such as a heart attack, coronary stent placed, heart failure, or a heart valve disease. The program includes exercise, lifestyle changes, education, and emotional support. Cardiac rehab can help you improve the quality of your life through better overall health. It can help you lose weight and feel better about yourself.    On your cardiac rehab team, you may have your doctor, a nurse specialist, an exercise physiologist, and a dietitian. They will design your cardiac rehab program specifically for you. You will learn how to reduce your risk for heart problems, how to manage stress, and how to eat a heart-healthy diet. By the end of the program, you will be ready to maintain a healthier lifestyle on your own.    Follow-up care is a key part of your treatment and safety. Be sure to make and go to all appointments, and call your doctor if you are having problems. It's also a good idea to know your test results and keep a list of the medicines you take.    Please call to schedule your first appointment once you have been cleared by your cardiologist to attend Phase II Outpatient Cardiac Rehabilitation.       Cardiac Rehabilitation Options:  [facility]             [facility]  [address]                                                                                          Cardiology Services  [place], [place] [address]                                                                              [address]  P- [phone]                                                                                         [place] [address]  Hours: M/W/F 7am-7pm & T/Th 7am-12pm                                                  [phone]

## 2025-03-22 NOTE — PROGRESS NOTES
Reason for follow up: Non-STEMI, status post direct stenting to mid LAD stenosis using drug-eluting stent    Subjective: Patient denies chest discomfort, dyspnea or orthopnea.    Objective:    No distress. No events overnight.                                        Scheduled Meds:   pantoprazole  40 mg Oral QAM AC    sodium chloride flush  5-40 mL IntraVENous 2 times per day    rosuvastatin  20 mg Oral Nightly    sodium chloride flush  5-40 mL IntraVENous 2 times per day    aspirin  81 mg Oral Daily    clopidogrel  600 mg Oral Once    [START ON 3/23/2025] clopidogrel  75 mg Oral Daily       Continuous Infusions:   sodium chloride      sodium chloride             Intake/Output Summary (Last 24 hours) at 3/22/2025 0811  Last data filed at 3/21/2025 1849  Gross per 24 hour   Intake 15 ml   Output 10 ml   Net 5 ml       Patient Vitals for the past 96 hrs (Last 3 readings):   Weight   03/21/25 0703 64.4 kg (142 lb)          PE:   BP (!) 104/59   Pulse 59   Temp 98 °F (36.7 °C) (Oral)   Resp 19   Ht 1.6 m (5' 3\")   Wt 64.4 kg (142 lb)   SpO2 100%   BMI 25.15 kg/m²   CONST: Middle-age female laying in bed in no acute distress.  She is undergoing an echocardiogram  HEENT: Head- normocephalic, atraumatic  Neck: no jugular venous distention. No carotid bruit noted.   LUNGS: Clear  CARDIOVASCULAR:  RRR, no murmur, s3, s4 or rub noted.   PV: No lower extremity edema.  Bruising noted over right forearm with palpable radial pulse pedal pulses palpable, no clubbing or cyanosis   ABDOMEN: Soft, non-tender to light palpation. Bowel sounds present. No palpable masses no hepatosplenomegaly or splenomegaly; no abdominal bruit / pulsation  SKIN: Warm and dry   NEURO / PSYCH: Oriented to person, place and time. Speech clear and appropriate. Follows all commands. Pleasant affect.       Monitor: Sinus bradycardia at 55 bpm      Lab Review       Recent Labs     03/21/25  0716 03/22/25  0507   WBC 7.0 7.9   HGB 13.7 11.5   HCT 38.9

## 2025-03-23 LAB
EKG ATRIAL RATE: 70 BPM
EKG P AXIS: 75 DEGREES
EKG P-R INTERVAL: 156 MS
EKG Q-T INTERVAL: 398 MS
EKG QRS DURATION: 96 MS
EKG QTC CALCULATION (BAZETT): 429 MS
EKG R AXIS: 28 DEGREES
EKG T AXIS: 37 DEGREES
EKG VENTRICULAR RATE: 70 BPM

## 2025-03-23 PROCEDURE — 93010 ELECTROCARDIOGRAM REPORT: CPT | Performed by: INTERNAL MEDICINE

## 2025-03-24 ENCOUNTER — TELEPHONE (OUTPATIENT)
Dept: ADMINISTRATIVE | Age: 60
End: 2025-03-24

## 2025-03-24 ENCOUNTER — TELEPHONE (OUTPATIENT)
Age: 60
End: 2025-03-24

## 2025-03-24 VITALS
HEIGHT: 63 IN | DIASTOLIC BLOOD PRESSURE: 79 MMHG | BODY MASS INDEX: 25 KG/M2 | RESPIRATION RATE: 18 BRPM | TEMPERATURE: 42.8 F | SYSTOLIC BLOOD PRESSURE: 125 MMHG | OXYGEN SATURATION: 99 % | WEIGHT: 141.09 LBS | HEART RATE: 61 BPM

## 2025-03-24 LAB
EKG ATRIAL RATE: 58 BPM
EKG P AXIS: 73 DEGREES
EKG P-R INTERVAL: 166 MS
EKG Q-T INTERVAL: 488 MS
EKG QRS DURATION: 92 MS
EKG QTC CALCULATION (BAZETT): 479 MS
EKG R AXIS: 46 DEGREES
EKG T AXIS: 94 DEGREES
EKG VENTRICULAR RATE: 58 BPM

## 2025-03-24 PROCEDURE — 93010 ELECTROCARDIOGRAM REPORT: CPT | Performed by: INTERNAL MEDICINE

## 2025-03-24 NOTE — TELEPHONE ENCOUNTER
Called and spoke with Isa in re: to post PCI education. She tells me that she is doing well but still nervous. She made a follow up appointment with Dr. Gonzalez and she is awaiting a call back from CR. I informed her that I would mail a packet of education out and to call with any questions. Enforced the importance of uninterrupted DAPT and radial precautions. Pt verbalized understanding.      Patient Education:  Post PCI, Risk Factors, Recovery, Prevention for Future, Lifestyle Changes     Mailed patient Education Packet to review information relating to current diagnosis, lifestyle Modification, and medications.   The packet contains information on the follow topics as they relate to the patient specifically.    1. CAD & Coronary Stents- A&P, cardiac cath, equipment used, heart anatomy  2. HTN- what is blood pressure, normals, how to manage BP/lifestyle changes  3. HLD-cholesterol, types, where it comes from, nutrition counseling  4. Diabetes Education- discussed cardiovascular effects, packet given for Hospital Education classes included if applicable.  5. Smoking cessation- nicotine effects on body and stents, how to quit, tobacco treatment center brochure included if applicable.  6. Active lifestyle suggestions- why activity and exercise are recommended, suggestions to start, Cardiac Rehabilitation benefits  7. Healthy eating- tips to help with Blood pressure and cholesterol management, eating regular meals, alternative food choices.  8. Stress management techniques  9. Post hospital follow up visit with PCP and cardiology encouraged  10. Medication Information- aspirin, P2Y12,, statins- what they are indicated for and importance of compliance.  Patient information card given to patient to keep in wallet or pocket for record keeping on medical history, doctor names and numbers, medication list.      Letter included with explanation of packet and my contact information and encouraged to call with questions

## 2025-03-25 ENCOUNTER — CARE COORDINATION (OUTPATIENT)
Dept: CARE COORDINATION | Age: 60
End: 2025-03-25

## 2025-03-25 DIAGNOSIS — I21.4 NSTEMI (NON-ST ELEVATED MYOCARDIAL INFARCTION) (HCC): Primary | ICD-10-CM

## 2025-03-25 RX ORDER — MULTIVIT WITH MINERALS/LUTEIN
1000 TABLET ORAL DAILY
COMMUNITY

## 2025-03-25 NOTE — CARE COORDINATION
Care Transitions Note    Initial Call - Call within 2 business days of discharge: Yes    Patient Current Location:  Home: [address] [place] [id number]    Care Transition Nurse contacted the patient by telephone to perform post hospital discharge assessment, verified name and  as identifiers.  Provided introduction to self, and explanation of the Care Transition Nurse role.    Patient: Isa Landa    Patient : 1965   MRN: [id number]    Reason for Admission: NSTEMI  Discharge Date: 3/22/25  RURS: Readmission Risk Score: 4.8      Last Discharge Facility       Date Complaint Diagnosis Description Type Department Provider    3/21/25 Chest Pain NSTEMI (non-ST elevated myocardial infarction) (HCC) ... ED to Hosp-Admission (Discharged) (ADMIT) [facility] Ke Cerda DO; Devika Rivera...            Was this an external facility discharge? No    Additional needs identified to be addressed with provider   No needs identified             Method of communication with provider: none.    Patients top risk factors for readmission: medical condition-NSTEMI    Interventions to address risk factors:   Has f/u appointment with cardiology as noted below.    Care Summary Note:   -Patient transferred from [facility] and admitted to [facility] on 3/21/25 with symptoms of chest pain. Underwent emergent LHC with cardiac stenting.  -Patient denies any return of chest pain but verbalizes any little pain or GERD symptom causes her worry.  CTN provided active listening/support to patient.   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  -States right wrist cath site has bruising which extends into the hand.  States area bled a lot post procedure and developed a hematoma prior to hospital discharge last Friday (3/21/25.)  States area is soft now and JAYME.  -Patient denies any needs at this time and is agreeable to continued follow up from care transitions.     Care Transition Nurse reviewed discharge instructions with patient. The patient was

## 2025-03-26 ENCOUNTER — OFFICE VISIT (OUTPATIENT)
Dept: CARDIOLOGY CLINIC | Age: 60
End: 2025-03-26
Payer: COMMERCIAL

## 2025-03-26 VITALS
HEART RATE: 59 BPM | RESPIRATION RATE: 18 BRPM | SYSTOLIC BLOOD PRESSURE: 104 MMHG | HEIGHT: 63 IN | DIASTOLIC BLOOD PRESSURE: 62 MMHG | BODY MASS INDEX: 25.87 KG/M2 | WEIGHT: 146 LBS

## 2025-03-26 DIAGNOSIS — E78.2 MIXED HYPERLIPIDEMIA: Primary | Chronic | ICD-10-CM

## 2025-03-26 LAB — ECHO BSA: 1.69 M2

## 2025-03-26 PROCEDURE — 93000 ELECTROCARDIOGRAM COMPLETE: CPT | Performed by: INTERNAL MEDICINE

## 2025-03-26 PROCEDURE — 99213 OFFICE O/P EST LOW 20 MIN: CPT | Performed by: INTERNAL MEDICINE

## 2025-03-26 RX ORDER — CLOPIDOGREL BISULFATE 75 MG/1
75 TABLET ORAL DAILY
Qty: 90 TABLET | Refills: 3 | Status: SHIPPED | OUTPATIENT
Start: 2025-03-26

## 2025-03-26 RX ORDER — PANTOPRAZOLE SODIUM 40 MG/1
40 TABLET, DELAYED RELEASE ORAL
Qty: 90 TABLET | Refills: 3 | Status: SHIPPED | OUTPATIENT
Start: 2025-03-26

## 2025-03-26 RX ORDER — METOPROLOL SUCCINATE 25 MG/1
12.5 TABLET, EXTENDED RELEASE ORAL DAILY
Qty: 90 TABLET | Refills: 2 | Status: SHIPPED | OUTPATIENT
Start: 2025-03-26

## 2025-03-26 RX ORDER — ASPIRIN 81 MG/1
81 TABLET ORAL DAILY
Qty: 180 TABLET | Refills: 3 | Status: SHIPPED | OUTPATIENT
Start: 2025-03-26

## 2025-03-26 RX ORDER — ROSUVASTATIN CALCIUM 20 MG/1
20 TABLET, COATED ORAL NIGHTLY
Qty: 90 TABLET | Refills: 3 | Status: SHIPPED | OUTPATIENT
Start: 2025-03-26

## 2025-03-31 ENCOUNTER — OFFICE VISIT (OUTPATIENT)
Dept: PRIMARY CARE CLINIC | Age: 60
End: 2025-03-31

## 2025-03-31 VITALS
WEIGHT: 146 LBS | DIASTOLIC BLOOD PRESSURE: 78 MMHG | HEIGHT: 63 IN | BODY MASS INDEX: 25.87 KG/M2 | OXYGEN SATURATION: 99 % | HEART RATE: 50 BPM | SYSTOLIC BLOOD PRESSURE: 102 MMHG | RESPIRATION RATE: 20 BRPM | TEMPERATURE: 97.2 F

## 2025-03-31 DIAGNOSIS — E78.2 MIXED HYPERLIPIDEMIA: Chronic | ICD-10-CM

## 2025-03-31 DIAGNOSIS — I25.5 ISCHEMIC CARDIOMYOPATHY: ICD-10-CM

## 2025-03-31 DIAGNOSIS — R09.81 NASAL CONGESTION: ICD-10-CM

## 2025-03-31 DIAGNOSIS — I21.4 NSTEMI (NON-ST ELEVATED MYOCARDIAL INFARCTION) (HCC): Primary | ICD-10-CM

## 2025-03-31 DIAGNOSIS — Z09 HOSPITAL DISCHARGE FOLLOW-UP: ICD-10-CM

## 2025-03-31 NOTE — PROGRESS NOTES
Post-Discharge Transitional Care  Follow Up      Isa Landa   YOB: 1965    Date of Office Visit:  3/31/2025  Date of Hospital Admission: 3/21/25  Date of Hospital Discharge: 3/22/25  Risk of hospital readmission (high >=14%. Medium >=10%) :Readmission Risk Score: 4.8      Care management risk score Rising risk (score 2-5) and Complex Care (Scores >=6): No Risk Score On File     Non face to face  following discharge, date last encounter closed (first attempt may have been earlier): 03/25/2025    Call initiated 2 business days of discharge: Yes    ASSESSMENT/PLAN:   NSTEMI (non-ST elevated myocardial infarction) (HCC)  Mixed hyperlipidemia  Nasal congestion  Ischemic cardiomyopathy  Hospital discharge follow-up  -     [place] DISCHARGE MEDS RECONCILED W/ CURRENT OUTPATIENT MED LIST  - cut Crestor to 10 mg from 20 mg and see if NC improves. If not in 7-10 days will need to stop and go PSK 9 drug.  - see cardio and due rehab    Medical Decision Making: high complexity  Return in 3 months (on 6/30/2025).           Subjective:   HPI:  Follow up of Hospital problems/diagnosis(es): NSTEMI, hyperlipidemia, ischemic cardiomyopathy, nasal congestion due to statin.     Inpatient course: Discharge summary reviewed- see chart.    Interval history/Current status: Gen: - F/c  CV: - cp or palp's  Resp: - sob, cough  Gi: - N/V  MS: achy knees and shins since on Crestor.   HEENT: + NC,     Patient Active Problem List   Diagnosis    Anemia, iron deficiency    GERD (gastroesophageal reflux disease)    Mitral valve prolapse    Osteoarthritis of both hands    Sinus bradycardia    Mixed hyperlipidemia    NSTEMI (non-ST elevated myocardial infarction) (HCC)    Nasal congestion    Ischemic cardiomyopathy       Medications listed as ordered at the time of discharge from hospital     Medication List            Accurate as of March 31, 2025 11:04 AM. If you have any questions, ask your nurse or doctor.                CONTINUE

## 2025-04-08 ENCOUNTER — CARE COORDINATION (OUTPATIENT)
Dept: CARE COORDINATION | Age: 60
End: 2025-04-08

## 2025-04-08 NOTE — CARE COORDINATION
-Patient returned call to CTN.  
Care Transitions Note    Follow Up Call     Attempted to reach patient for transitions of care follow up.  Unable to reach patient.      Outreach Attempts:   HIPAA compliant voicemail left for patient.  First attempt.    Care Summary Note:   -Noted in EMR patient completed cardiology and PCP TCM/HFU appointments.    Follow Up Appointment:   Future Appointments         Provider Specialty Dept Phone    7/24/2025 2:45 PM Viji Gonzalez MD Cardiology [phone]    7/28/2025 2:00 PM Kar Strauss,  Primary Care [phone]            Plan for follow-up call in 11-14 days based on severity of symptoms and risk factors.  Plan for next call: symptom management-any return of chest pain?  follow-up appointment-review any provider visits as applicable.    Check right wrist cath site.  Final call.    Isamar Fajardo RN       
Interventions:              Follow Up Appointment:   JAMIL appointment attended as scheduled   Future Appointments         Provider Specialty Dept Phone    7/24/2025 2:45 PM Viji Gonzalez MD Cardiology [phone]    7/28/2025 2:00 PM Kar Strauss,  Primary Care [phone]            Care Transition Nurse provided contact information.  Plan for follow-up call in 11-14 days based on severity of symptoms and risk factors.  Plan for next call: symptom management-any return of chest pain?  Final call.      Isamar Fajardo RN

## 2025-04-09 ENCOUNTER — TELEPHONE (OUTPATIENT)
Dept: CARDIAC REHAB | Age: 60
End: 2025-04-09

## 2025-04-09 NOTE — TELEPHONE ENCOUNTER
Spoke with pt about her cardiac rehab referral, pt is back to the gym doing kickboxing and weight training and wants to continue there at this time.

## 2025-04-17 RX ORDER — ROSUVASTATIN CALCIUM 10 MG/1
10 TABLET, COATED ORAL NIGHTLY
Qty: 90 TABLET | Refills: 1 | Status: SHIPPED | OUTPATIENT
Start: 2025-04-17

## 2025-04-22 ENCOUNTER — CARE COORDINATION (OUTPATIENT)
Dept: CARE COORDINATION | Age: 60
End: 2025-04-22

## 2025-04-22 NOTE — CARE COORDINATION
Care Transitions Note    Final Call     Patient Current Location:  [facility] [facility] [address]    Care Transition Nurse contacted the patient by telephone. Verified name and  as identifiers.    Patient graduated from the Care Transitions program on 25.  Patient/family has the ability to self-manage at this time.     Advance Care Planning:   Does patient have an Advance Directive: health care decision maker updated on a prior call.    Handoff:   Patient was not referred to the ACM team due to no additional needs identified.       Care Summary Note:   -Patient doing well, no return of any chest pain. Bruising of wrist resolved.  -Has returned to working out in the gym and tolerating.  -Patient denies any needs at this time.   -CTN to sign off as care transition program ends in two days.     Assessments:  Care Transitions Subsequent and Final Call    Subsequent and Final Calls  Do you have any ongoing symptoms?: No  Have your medications changed?: No  Do you have any questions related to your medications?: No  Do you currently have any active services?: No  Do you have any needs or concerns that I can assist you with?: No  Identified Barriers: None  Care Transitions Interventions  No Identified Needs  Other Interventions:              Upcoming Appointments:    Future Appointments         Provider Specialty Dept Phone    2025 2:45 PM Viji Gonzalez MD Cardiology [phone]    2025 2:00 PM Kar Strauss DO Primary Care [phone]            Patient has agreed to contact primary care provider and/or specialist for any further questions, concerns, or needs.    Isamar Fajardo RN

## 2025-08-01 ENCOUNTER — OFFICE VISIT (OUTPATIENT)
Dept: PRIMARY CARE CLINIC | Age: 60
End: 2025-08-01
Payer: COMMERCIAL

## 2025-08-01 VITALS
HEIGHT: 63 IN | TEMPERATURE: 97.4 F | BODY MASS INDEX: 26.22 KG/M2 | SYSTOLIC BLOOD PRESSURE: 132 MMHG | WEIGHT: 148 LBS | DIASTOLIC BLOOD PRESSURE: 70 MMHG | HEART RATE: 54 BPM | OXYGEN SATURATION: 99 %

## 2025-08-01 DIAGNOSIS — Z12.11 COLON CANCER SCREENING: ICD-10-CM

## 2025-08-01 DIAGNOSIS — I25.5 ISCHEMIC CARDIOMYOPATHY: ICD-10-CM

## 2025-08-01 DIAGNOSIS — I21.4 NSTEMI (NON-ST ELEVATED MYOCARDIAL INFARCTION) (HCC): Primary | ICD-10-CM

## 2025-08-01 DIAGNOSIS — Z87.891 PERSONAL HISTORY OF TOBACCO USE: ICD-10-CM

## 2025-08-01 PROCEDURE — 99214 OFFICE O/P EST MOD 30 MIN: CPT | Performed by: FAMILY MEDICINE

## 2025-08-01 PROCEDURE — G0296 VISIT TO DETERM LDCT ELIG: HCPCS | Performed by: FAMILY MEDICINE

## 2025-08-01 ASSESSMENT — ENCOUNTER SYMPTOMS
CHEST TIGHTNESS: 0
SHORTNESS OF BREATH: 0

## 2025-08-01 NOTE — PROGRESS NOTES
Isa Landa, a female of 59 y.o. came to the office 8/1/2025.     Patient Active Problem List   Diagnosis    Anemia, iron deficiency    GERD (gastroesophageal reflux disease)    Mitral valve prolapse    Osteoarthritis of both hands    Sinus bradycardia    Mixed hyperlipidemia    NSTEMI (non-ST elevated myocardial infarction) (HCC)    Nasal congestion    Ischemic cardiomyopathy          Coronary Artery Disease  Presents for follow-up visit. Pertinent negatives include no chest pain, chest pressure, chest tightness, dizziness, leg swelling, muscle weakness, palpitations, shortness of breath or weight gain. The symptoms have been stable. Compliance with diet is good. Compliance with exercise is good. Compliance with medications is good.        Allergies   Allergen Reactions    Latex     Lipitor [Atorvastatin] Other (See Comments)     Developed URI symptoms that improved off Lipitor    Metamucil [Psyllium]        Current Outpatient Medications on File Prior to Visit   Medication Sig Dispense Refill    rosuvastatin (CRESTOR) 10 MG tablet Take 1 tablet by mouth nightly 90 tablet 1    aspirin 81 MG EC tablet Take 1 tablet by mouth daily 180 tablet 3    metoprolol succinate (TOPROL XL) 25 MG extended release tablet Take 0.5 tablets by mouth daily 90 tablet 2    clopidogrel (PLAVIX) 75 MG tablet Take 1 tablet by mouth daily 90 tablet 3    sacubitril-valsartan (ENTRESTO) 24-26 MG per tablet Take 1 tablet by mouth 2 times daily 180 tablet 3    pantoprazole (PROTONIX) 40 MG tablet Take 1 tablet by mouth every morning (before breakfast) 90 tablet 3    Omega-3 Fatty Acids (OMEGA 3 PO) Take 1 capsule by mouth 2 times daily      Ascorbic Acid (VITAMIN C) 1000 MG tablet Take 1 tablet by mouth daily      nitroGLYCERIN (NITROSTAT) 0.4 MG SL tablet Place 1 tablet under the tongue every 5 minutes as needed for Chest pain up to max of 3 total doses. If no relief after 1 dose, call [phone]. 15 tablet 3     No current facility-administered

## 2025-08-05 ENCOUNTER — HOSPITAL ENCOUNTER (OUTPATIENT)
Dept: LAB | Age: 60
Discharge: HOME | End: 2025-08-05
Payer: COMMERCIAL

## 2025-08-05 DIAGNOSIS — I21.4 NSTEMI (NON-ST ELEVATED MYOCARDIAL INFARCTION) (HCC): ICD-10-CM

## 2025-08-05 LAB
ALBUMIN SERPL-MCNC: 4.5 G/DL (ref 3.5–5.2)
ALP SERPL-CCNC: 57 U/L (ref 35–104)
ALT SERPL-CCNC: 25 U/L (ref 0–35)
ANION GAP SERPL CALCULATED.3IONS-SCNC: 11 MMOL/L (ref 7–16)
AST SERPL-CCNC: 24 U/L (ref 0–35)
BILIRUB SERPL-MCNC: 0.2 MG/DL (ref 0–1.2)
BUN SERPL-MCNC: 14 MG/DL (ref 8–23)
CALCIUM SERPL-MCNC: 9.4 MG/DL (ref 8.8–10.2)
CHLORIDE SERPL-SCNC: 105 MMOL/L (ref 98–107)
CHOLEST SERPL-MCNC: 138 MG/DL
CO2 SERPL-SCNC: 27 MMOL/L (ref 22–29)
CREAT SERPL-MCNC: 0.7 MG/DL (ref 0.5–1)
ERYTHROCYTE [DISTWIDTH] IN BLOOD BY AUTOMATED COUNT: 12.5 % (ref 11.5–15)
GFR, ESTIMATED: >90 ML/MIN/1.73M2
GLUCOSE SERPL-MCNC: 111 MG/DL (ref 74–99)
HCT VFR BLD AUTO: 39.8 % (ref 34–48)
HDLC SERPL-MCNC: 61 MG/DL
HGB BLD-MCNC: 13.5 G/DL (ref 11.5–15.5)
LDLC SERPL CALC-MCNC: 62 MG/DL
MCH RBC QN AUTO: 30.8 PG (ref 26–35)
MCHC RBC AUTO-ENTMCNC: 33.9 G/DL (ref 32–34.5)
MCV RBC AUTO: 90.9 FL (ref 80–99.9)
PLATELET # BLD AUTO: 251 K/UL (ref 130–450)
PMV BLD AUTO: 9.1 FL (ref 7–12)
POTASSIUM SERPL-SCNC: 4.3 MMOL/L (ref 3.5–5.1)
PROT SERPL-MCNC: 6.5 G/DL (ref 6.4–8.3)
RBC # BLD AUTO: 4.38 M/UL (ref 3.5–5.5)
SODIUM SERPL-SCNC: 142 MMOL/L (ref 136–145)
TRIGL SERPL-MCNC: 77 MG/DL
VLDLC SERPL CALC-MCNC: 15 MG/DL
WBC OTHER # BLD: 7 K/UL (ref 4.5–11.5)

## 2025-08-05 PROCEDURE — 80053 COMPREHEN METABOLIC PANEL: CPT

## 2025-08-05 PROCEDURE — 36415 COLL VENOUS BLD VENIPUNCTURE: CPT

## 2025-08-05 PROCEDURE — 80061 LIPID PANEL: CPT

## 2025-08-05 PROCEDURE — 85027 COMPLETE CBC AUTOMATED: CPT

## 2025-08-22 ENCOUNTER — OFFICE VISIT (OUTPATIENT)
Dept: SURGERY | Age: 60
End: 2025-08-22

## 2025-08-22 VITALS
HEART RATE: 55 BPM | BODY MASS INDEX: 27.11 KG/M2 | WEIGHT: 153 LBS | TEMPERATURE: 97 F | DIASTOLIC BLOOD PRESSURE: 73 MMHG | HEIGHT: 63 IN | SYSTOLIC BLOOD PRESSURE: 113 MMHG

## 2025-08-22 DIAGNOSIS — Z12.11 ENCOUNTER FOR SCREENING COLONOSCOPY: Primary | ICD-10-CM

## 2025-08-22 ASSESSMENT — ENCOUNTER SYMPTOMS
WHEEZING: 0
CONSTIPATION: 0
COLOR CHANGE: 0
ANAL BLEEDING: 0
COUGH: 0
BLOOD IN STOOL: 0
TROUBLE SWALLOWING: 0
APNEA: 0
SHORTNESS OF BREATH: 0
VOMITING: 0
CHOKING: 0
ABDOMINAL DISTENTION: 0
ABDOMINAL PAIN: 0
NAUSEA: 0
CHEST TIGHTNESS: 0
STRIDOR: 0
DIARRHEA: 0

## 2025-09-02 ENCOUNTER — HOSPITAL ENCOUNTER (OUTPATIENT)
Dept: CT IMAGING | Age: 60
Discharge: HOME | End: 2025-09-04
Attending: FAMILY MEDICINE
Payer: COMMERCIAL

## 2025-09-02 DIAGNOSIS — Z87.891 PERSONAL HISTORY OF TOBACCO USE: ICD-10-CM

## 2025-09-02 PROCEDURE — 71271 CT THORAX LUNG CANCER SCR C-: CPT

## (undated) DEVICE — SURGICAL PROCEDURE KIT 3 W

## (undated) DEVICE — GUIDEWIRE VASC L260CM 0.035IN J TIP L3MM PTFE FIX COR NAMIC

## (undated) DEVICE — CATH BLLN ANGIO 2.25X12MM NC EUPHORIA RX

## (undated) DEVICE — INFLATION DEVICE KIT: Brand: ENCORE™ 26 ADVANTAGE KIT

## (undated) DEVICE — Device

## (undated) DEVICE — ANGIOPLASTY ADD-ON PACK: Brand: MEDLINE INDUSTRIES, INC.

## (undated) DEVICE — CATHETER COR DIAG JUDKINS L 3.5 CRV 6FR 100CM 0 SIDE H

## (undated) DEVICE — KIT MFLD ISOLATN NACL CNTRST PRT TBNG SPIK W/ PRSS TRNSDUC

## (undated) DEVICE — TUBING PRSS MON L12IN PVC RIG NONEXPANDING M TO FEM CONN

## (undated) DEVICE — CATHETER GUID 6FR L100CM DIA0.071IN NYL SHFT EBU3.0 W/O

## (undated) DEVICE — CATHETER DIAG 6FR L100CM LUMN ID0.056IN JR4 CRV 0 SIDE H

## (undated) DEVICE — CATHETER DIAG 6FR L110CM PIGTAILS CRV STYL PIG145 DXTERITY

## (undated) DEVICE — GLIDESHEATH NITINOL HYDROPHILIC COATED INTRODUCER SHEATH: Brand: GLIDESHEATH

## (undated) DEVICE — BAND COMPR L24CM REG CLR PLAS HEMSTAT EXT HK AND LOOP RETEN

## (undated) DEVICE — DEVICE TORQ FLRESCNT PNK FOR HEMSTAS VLV

## (undated) DEVICE — CANNULA NSL CANN NSL L25FT TBNG AD O2 SUP SFT UC

## (undated) DEVICE — KIT ANGIO W/ AT P65 PREM HND CTRL FOR CNTRST DEL ANGIOTOUCH

## (undated) DEVICE — PAD, DEFIB, ADULT, RADIOTRAN, PHYSIO, LO: Brand: MEDLINE

## (undated) DEVICE — COPILOT BLEEDBACK CONTROL VALVE: Brand: COPILOT

## (undated) DEVICE — HI-TORQUE BALANCE MIDDLEWEIGHT GUIDE WIRE W/HYDROCOAT .014 STRAIGHT TIP 3.0 CM X 190 CM: Brand: HI-TORQUE BALANCE MIDDLEWEIGHT